# Patient Record
Sex: MALE | Race: WHITE | Employment: OTHER | RURAL
[De-identification: names, ages, dates, MRNs, and addresses within clinical notes are randomized per-mention and may not be internally consistent; named-entity substitution may affect disease eponyms.]

---

## 2017-01-09 ENCOUNTER — CLINICAL SUPPORT (OUTPATIENT)
Dept: INTERNAL MEDICINE CLINIC | Age: 62
End: 2017-01-09

## 2017-01-09 DIAGNOSIS — Z86.711 HISTORY OF PULMONARY EMBOLISM: Primary | ICD-10-CM

## 2017-01-09 NOTE — PROGRESS NOTES
Current dose of Coumadin is 3mg on Mon, Wed, and Fridays - 2mg on all other days - PT/ INR 23.7/ 2.0 - /75, heart rate at 78 - per verbal order of Parveen Devi MD advised patient to continue Coumadin with no change and follow up in 1 month to recheck PT level  Becky Devi LPN  7/3/0918  2:79 PM

## 2017-03-03 ENCOUNTER — CLINICAL SUPPORT (OUTPATIENT)
Dept: INTERNAL MEDICINE CLINIC | Age: 62
End: 2017-03-03

## 2017-03-03 DIAGNOSIS — Z86.711 HISTORY OF PULMONARY EMBOLISM: Primary | ICD-10-CM

## 2017-03-03 LAB
INR BLD: 2
PT POC: 24.2 SEC
VALID INTERNAL CONTROL?: YES

## 2017-03-03 RX ORDER — WARFARIN 2 MG/1
TABLET ORAL
Qty: 60 TAB | Refills: 1 | Status: SHIPPED | OUTPATIENT
Start: 2017-03-03 | End: 2017-06-21 | Stop reason: SDUPTHER

## 2017-03-03 NOTE — TELEPHONE ENCOUNTER
Requested Prescriptions     Pending Prescriptions Disp Refills    warfarin (COUMADIN) 2 mg tablet 60 Tab 1     Sig: 3 mg (1.5 tabs) on Mon Weds Fri and Sat.  2 mg (1 Tab) on the other days     Last office visit 12/20/16  Last filled 9/19/16  Changes made to medication on last visit  none

## 2017-03-03 NOTE — PROGRESS NOTES
Chief Complaint   Patient presents with    Coagulation disorder       No pain, no chest pain, no shortness of breath, no bleeding, no redness, and no swelling on finger at INR site. PT/INR  24.2/2.0     As per Dr. Margarette Macdonald, no new orders. Returned in 4 weeks.

## 2017-05-09 ENCOUNTER — CLINICAL SUPPORT (OUTPATIENT)
Dept: INTERNAL MEDICINE CLINIC | Age: 62
End: 2017-05-09

## 2017-05-09 DIAGNOSIS — Z86.711 HISTORY OF PULMONARY EMBOLISM: Primary | ICD-10-CM

## 2017-05-09 LAB
INR BLD: 2.5
PT POC: 30 SEC
VALID INTERNAL CONTROL?: YES

## 2017-05-09 NOTE — PROGRESS NOTES
Current Coumadin dose is 7.5mg on Mon, wed, Fri - 5mg all other days - PT/INR 30.0/ 2.5 - per verbal order of Jason Dorantes MD patient was instructed to continue with current dosage and follow up to recheck PT level in 1 month  Codie Evans LPN  4/0/1168  3:30 AM

## 2017-06-21 ENCOUNTER — CLINICAL SUPPORT (OUTPATIENT)
Dept: INTERNAL MEDICINE CLINIC | Age: 62
End: 2017-06-21

## 2017-06-21 DIAGNOSIS — Z86.711 HISTORY OF PULMONARY EMBOLISM: Primary | ICD-10-CM

## 2017-06-21 LAB
INR BLD: 2.1
PT POC: 25.7 SECONDS
VALID INTERNAL CONTROL?: YES

## 2017-06-21 RX ORDER — WARFARIN 2 MG/1
TABLET ORAL
Qty: 60 TAB | Refills: 1 | Status: SHIPPED | OUTPATIENT
Start: 2017-06-21 | End: 2017-09-11 | Stop reason: SDUPTHER

## 2017-06-21 NOTE — PROGRESS NOTES
Current dose of Coumadin is 3mg on Mon, Wed and Fri - 2mg all other days - PT/ INR 25.7/ 2.1 - per verbal order of Omkar Zuniga MD patient was instructed to continue with his current dose of Coumadin and recheck PT level in 1 month  Jessica Nieves LPN  8/48/8708  6:03 AM

## 2017-07-14 ENCOUNTER — OFFICE VISIT (OUTPATIENT)
Dept: INTERNAL MEDICINE CLINIC | Age: 62
End: 2017-07-14

## 2017-07-14 VITALS
HEIGHT: 76 IN | DIASTOLIC BLOOD PRESSURE: 81 MMHG | TEMPERATURE: 98 F | HEART RATE: 96 BPM | SYSTOLIC BLOOD PRESSURE: 122 MMHG | WEIGHT: 310 LBS | RESPIRATION RATE: 17 BRPM | BODY MASS INDEX: 37.75 KG/M2 | OXYGEN SATURATION: 97 %

## 2017-07-14 DIAGNOSIS — Z86.711 HISTORY OF PULMONARY EMBOLISM: ICD-10-CM

## 2017-07-14 DIAGNOSIS — J01.00 ACUTE NON-RECURRENT MAXILLARY SINUSITIS: Primary | ICD-10-CM

## 2017-07-14 PROBLEM — M25.621 DECREASED ROM OF RIGHT ELBOW: Status: ACTIVE | Noted: 2017-06-29

## 2017-07-14 LAB
INR BLD: 2
PT POC: 23.5 SECONDS
VALID INTERNAL CONTROL?: YES

## 2017-07-14 RX ORDER — TRAMADOL HYDROCHLORIDE 50 MG/1
TABLET ORAL
Refills: 0 | COMMUNITY
Start: 2017-05-19 | End: 2017-09-11 | Stop reason: ALTCHOICE

## 2017-07-14 RX ORDER — KETOCONAZOLE 20 MG/G
CREAM TOPICAL
COMMUNITY
Start: 2017-05-09 | End: 2017-09-11 | Stop reason: ALTCHOICE

## 2017-07-14 RX ORDER — HYDROCODONE BITARTRATE AND ACETAMINOPHEN 5; 325 MG/1; MG/1
TABLET ORAL
COMMUNITY
Start: 2017-05-02 | End: 2017-09-11 | Stop reason: ALTCHOICE

## 2017-07-14 RX ORDER — AMOXICILLIN AND CLAVULANATE POTASSIUM 500; 125 MG/1; MG/1
1 TABLET, FILM COATED ORAL 2 TIMES DAILY
Qty: 20 TAB | Refills: 0 | Status: SHIPPED | OUTPATIENT
Start: 2017-07-14 | End: 2017-07-24

## 2017-07-14 RX ORDER — TETANUS TOXOID, REDUCED DIPHTHERIA TOXOID AND ACELLULAR PERTUSSIS VACCINE, ADSORBED 5; 2.5; 8; 8; 2.5 [IU]/.5ML; [IU]/.5ML; UG/.5ML; UG/.5ML; UG/.5ML
SUSPENSION INTRAMUSCULAR
COMMUNITY
Start: 2017-05-02 | End: 2020-09-18

## 2017-07-14 NOTE — PROGRESS NOTES
Chief Complaint   Patient presents with    Sinus Infection     X 1 WEEK, NASAL DISCHARGE, PRODUCTIVE COUGH    Coagulation disorder     1. Have you been to the ER, urgent care clinic since your last visit? Hospitalized since your last visit? Yes When: [facility] Where: May 2017 Reason for visit: Broken Arm    2. Have you seen or consulted any other health care providers outside of the [address] [place] [address] since your last visit? Include any pap smears or colon screening. No     Health Maintenance Due   Topic Date Due    Hepatitis C Screening  1955    COLONOSCOPY  09/23/1972    DTaP/Tdap/Td series (1 - Tdap) 09/23/1975    ZOSTER VACCINE AGE 60>  09/23/2014     Colonoscopy performed February 2011.

## 2017-07-14 NOTE — MR AVS SNAPSHOT
Visit Information Date & Time Provider Department Dept. Phone Encounter #  
 7/14/2017  2:00 PM Neelam Davis NP [facility] [phone] [id number] Upcoming Health Maintenance Date Due Hepatitis C Screening 1955 COLONOSCOPY 9/23/1972 DTaP/Tdap/Td series (1 - Tdap) 9/23/1975 ZOSTER VACCINE AGE 60> 9/23/2014 INFLUENZA AGE 9 TO ADULT 8/1/2017 Allergies as of 7/14/2017  Review Complete On: 7/14/2017 By: Neelam Davis NP No Known Allergies Current Immunizations  Never Reviewed No immunizations on file. Not reviewed this visit You Were Diagnosed With   
  
 Codes Comments History of pulmonary embolism    -  Primary ICD-10-CM: Z86.711 ICD-9-CM: V12.55 Acute non-recurrent maxillary sinusitis     ICD-10-CM: J01.00 ICD-9-CM: 461.0 Vitals BP Pulse Temp Resp Height(growth percentile) Weight(growth percentile) 122/81 (BP 1 Location: Left arm, BP Patient Position: Sitting) 96 98 °F (36.7 °C) (Oral) 17 6' 4\" (1.93 m) 310 lb (140.6 kg) SpO2 BMI Smoking Status 97% 37.73 kg/m2 Former Smoker BMI and BSA Data Body Mass Index Body Surface Area  
 37.73 kg/m 2 2.75 m 2 Preferred Pharmacy Pharmacy Name Phone [facility] [phone] Your Updated Medication List  
  
   
This list is accurate as of: 7/14/17  3:03 PM.  Always use your most recent med list.  
  
  
  
  
 acetaminophen 500 mg tablet Commonly known as:  TYLENOL Take  by mouth every six (6) hours as needed for Pain.  
  
 amoxicillin-clavulanate 500-125 mg per tablet Commonly known as:  AUGMENTIN Take 1 Tab by mouth two (2) times a day for 10 days. BOOSTRIX TDAP 2.5-8-5 Lf-mcg-Lf/0.5mL Susp susp Generic drug:  Diphth, Pertus(Acell), Tetanus HYDROcodone-acetaminophen 5-325 mg per tablet Commonly known as:  Олег Miller  
  
 ketoconazole 2 % topical cream  
Commonly known as:  NIZORAL  
  
 meclizine 25 mg tablet Commonly known as:  ANTIVERT Take  by mouth three (3) times daily as needed. traMADol 50 mg tablet Commonly known as:  ULTRAM  
take 1 tablet by mouth three times a day  
  
 triamcinolone 55 mcg nasal inhaler Commonly known as:  NASACORT AQ  
2 Sprays daily. TRIAMTERENE-HYDROCHLOROTHIAZIDE 37.5 MG-25 MG TAB  
  
 warfarin 2 mg tablet Commonly known as:  COUMADIN  
3 mg (1.5 tabs) on Mon Weds Fri and Sat. 2 mg (1 Tab) on the other days Prescriptions Sent to Pharmacy Refills  
 amoxicillin-clavulanate (AUGMENTIN) 500-125 mg per tablet 0 Sig: Take 1 Tab by mouth two (2) times a day for 10 days. Class: Normal  
 Pharmacy: [facility] [address], [address] [place] [address] #: [phone] Route: Oral  
  
We Performed the Following AMB POC PT/INR [55238 CPT(R)] Introducing [facility]! Alexia Manriquez introduces [employer] patient portal. Now you can access parts of your medical record, email your doctor's office, and request medication refills online. 1. In your internet browser, go to [email or web address] 2. Click on the First Time User? Click Here link in the Sign In box. You will see the New Member Sign Up page. 3. Enter your [employer] Access Code exactly as it appears below. You will not need to use this code after youve completed the sign-up process. If you do not sign up before the expiration date, you must request a new code. · [employer] Access Code: [email or web address] Expires: 8/7/2017  9:08 AM 
 
4. Enter the last four digits of your Social Security Number (xxxx) and Date of Birth (mm/dd/yyyy) as indicated and click Submit. You will be taken to the next sign-up page. 5. Create a [employer]era ID. This will be your [employer] login ID and cannot be changed, so think of one that is secure and easy to remember. 6. Create a [employer] password. You can change your password at any time. 7. Enter your Password Reset Question and Answer. This can be used at a later time if you forget your password. 8. Enter your e-mail address. You will receive e-mail notification when new information is available in [address] 9. Click Sign Up. You can now view and download portions of your medical record. 10. Click the Download Summary menu link to download a portable copy of your medical information. If you have questions, please visit the Frequently Asked Questions section of the [employer] website. Remember, [employer] is NOT to be used for urgent needs. For medical emergencies, dial 911. Now available from your iPhone and Android! Please provide this summary of care documentation to your next provider. Your primary care clinician is listed as Terrance Armando. If you have any questions after today's visit, please call [phone].

## 2017-07-20 NOTE — PROGRESS NOTES
HISTORY OF PRESENT ILLNESS  Raffaele Troy is a 57 y.o. male. HPI  Chief Complaint   Patient presents with    Sinus Infection     X 1 WEEK, NASAL DISCHARGE, PRODUCTIVE COUGH    Coagulation disorder     PT/INR 2.0   C/O nasal sinus congestion with nasal discharge with blowing nose and coughing up thick sputum. Denies fever or chills. Review of Systems   Constitutional: Negative. Negative for chills and fever. HENT: Negative. Negative for congestion, ear pain and sore throat. Eyes: Negative. Negative for blurred vision. Respiratory: Negative. Negative for cough and shortness of breath. Cardiovascular: Negative. Negative for chest pain and leg swelling. Gastrointestinal: Negative. Negative for abdominal pain, constipation, diarrhea, heartburn, nausea and vomiting. Genitourinary: Negative. Musculoskeletal: Negative. Skin: Negative. Neurological: Negative. Negative for headaches. Physical Exam   Constitutional: He is oriented to person, place, and time. He appears well-developed and well-nourished. No distress. HENT:   Head: Normocephalic and atraumatic. Positive for pain with palpation of frontal and maxillary sinuses. Eyes: Conjunctivae are normal.   Cardiovascular: Normal rate, regular rhythm, normal heart sounds and intact distal pulses. Exam reveals no gallop and no friction rub. No murmur heard. Pulmonary/Chest: Effort normal and breath sounds normal. No respiratory distress. He has no wheezes. He has no rales. Musculoskeletal: Normal range of motion. Neurological: He is alert and oriented to person, place, and time. Skin: Skin is warm and dry. He is not diaphoretic. Nursing note and vitals reviewed. Plan of care and AVS reviewed with patient who verbalized understanding. ASSESSMENT and PLAN    ICD-10-CM ICD-9-CM    1. Acute non-recurrent maxillary sinusitis J01.00 461.0 amoxicillin-clavulanate (AUGMENTIN) 500-125 mg per tablet   2.  History of pulmonary embolism Z86.711 V12.55 AMB POC PT/INR   REviewed with patient use of augmentin and SE.  1 month check PT/INR  Keep regular scheduled appointment with PCP.

## 2017-08-31 ENCOUNTER — CLINICAL SUPPORT (OUTPATIENT)
Dept: INTERNAL MEDICINE CLINIC | Age: 62
End: 2017-08-31

## 2017-08-31 VITALS
DIASTOLIC BLOOD PRESSURE: 69 MMHG | RESPIRATION RATE: 16 BRPM | OXYGEN SATURATION: 93 % | SYSTOLIC BLOOD PRESSURE: 115 MMHG | HEART RATE: 83 BPM | HEIGHT: 76 IN | TEMPERATURE: 97.2 F

## 2017-08-31 DIAGNOSIS — Z86.711 HISTORY OF PULMONARY EMBOLISM: Primary | ICD-10-CM

## 2017-08-31 LAB
INR BLD: 2.1
PT POC: 24.9 SECONDS
VALID INTERNAL CONTROL?: YES

## 2017-08-31 NOTE — PROGRESS NOTES
Chief Complaint   Patient presents with    Coagulation disorder     Prabha Alvarez is a 57 y.o. male who presents today for Anticoagulation monitoring. Indication: PE  Current dose:  Coumadin 2mg      New Coumadin dose:.current treatment plan is effective, no change in therapy per Dr. Roxanne Rowe    Next check to be scheduled for  6 weeks per Dr. Roxanne Rowe.

## 2017-09-11 ENCOUNTER — OFFICE VISIT (OUTPATIENT)
Dept: INTERNAL MEDICINE CLINIC | Age: 62
End: 2017-09-11

## 2017-09-11 VITALS
BODY MASS INDEX: 36.53 KG/M2 | HEART RATE: 81 BPM | OXYGEN SATURATION: 98 % | SYSTOLIC BLOOD PRESSURE: 136 MMHG | DIASTOLIC BLOOD PRESSURE: 86 MMHG | HEIGHT: 76 IN | WEIGHT: 300 LBS | TEMPERATURE: 96.5 F | RESPIRATION RATE: 16 BRPM

## 2017-09-11 DIAGNOSIS — H81.09 MENIERE DISEASE, UNSPECIFIED LATERALITY: ICD-10-CM

## 2017-09-11 DIAGNOSIS — Z86.711 HISTORY OF PULMONARY EMBOLISM: ICD-10-CM

## 2017-09-11 DIAGNOSIS — Z12.11 SCREEN FOR COLON CANCER: ICD-10-CM

## 2017-09-11 DIAGNOSIS — Z13.220 SCREENING CHOLESTEROL LEVEL: ICD-10-CM

## 2017-09-11 DIAGNOSIS — Z13.1 SCREENING FOR DIABETES MELLITUS: ICD-10-CM

## 2017-09-11 DIAGNOSIS — Z23 ENCOUNTER FOR IMMUNIZATION: ICD-10-CM

## 2017-09-11 DIAGNOSIS — Z12.5 PROSTATE CANCER SCREENING: ICD-10-CM

## 2017-09-11 DIAGNOSIS — M19.90 ARTHRITIS: ICD-10-CM

## 2017-09-11 DIAGNOSIS — Z00.00 ROUTINE MEDICAL EXAM: Primary | ICD-10-CM

## 2017-09-11 PROBLEM — M25.621 DECREASED ROM OF RIGHT ELBOW: Status: RESOLVED | Noted: 2017-06-29 | Resolved: 2017-09-11

## 2017-09-11 LAB
INR BLD: 1.7
PT POC: 20.3 SECONDS
VALID INTERNAL CONTROL?: YES

## 2017-09-11 RX ORDER — CYCLOBENZAPRINE HCL 5 MG
5 TABLET ORAL
Qty: 60 TAB | Refills: 0 | Status: SHIPPED | OUTPATIENT
Start: 2017-09-11 | End: 2020-09-18 | Stop reason: ALTCHOICE

## 2017-09-11 RX ORDER — WARFARIN 2 MG/1
TABLET ORAL
Qty: 90 TAB | Refills: 1 | Status: SHIPPED | OUTPATIENT
Start: 2017-09-11 | End: 2018-01-11 | Stop reason: SDUPTHER

## 2017-09-11 NOTE — PROGRESS NOTES
Subjective:     Stanley Mason is a 57 y.o. male presenting for annual exam and complete physical.  Been a little depressed since alf but he getting better golfing and fishing, forced out of work when he could not keep up. Dec taking meds for depression, not that bad. Dizziness stable inc if anxious like when mom hospitalized, no head trauma. Elbow doing OK  No complaints of exertional chest pain, excessive shortness of breath or focal weakness. Minimal swelling in lower legs or dizziness with standing. Wants MR for occa back pain issues maribel if he is more exertional.    Patient Active Problem List    Diagnosis Date Noted    Decreased ROM of right elbow 06/29/2017    Meniere disease 06/21/2016    History of pulmonary embolism 06/21/2016     Current Outpatient Prescriptions   Medication Sig Dispense Refill    cyclobenzaprine (FLEXERIL) 5 mg tablet Take 1 Tab by mouth three (3) times daily as needed for Muscle Spasm(s). 60 Tab 0    BOOSTRIX TDAP 2.5-8-5 Lf-mcg-Lf/0.5mL susp susp       warfarin (COUMADIN) 2 mg tablet 3 mg (1.5 tabs) on Mon Weds Fri and Sat. 2 mg (1 Tab) on the other days 60 Tab 1    triamcinolone (NASACORT AQ) 55 mcg nasal inhaler 2 Sprays daily.  acetaminophen (TYLENOL) 500 mg tablet Take  by mouth every six (6) hours as needed for Pain.        No Known Allergies  Past Medical History:   Diagnosis Date    Depression     Headache     Sarcoid ([address]     Full recovery     Past Surgical History:   Procedure Laterality Date    HX ACL RECONSTRUCTION  1997    HX CHOLECYSTECTOMY  2006    HX COLONOSCOPY  1/1/2013    HX TONSIL AND ADENOIDECTOMY       Family History   Problem Relation Age of Onset    Diabetes Mother     Stroke Mother     Cancer Father      Social History   Substance Use Topics    Smoking status: Former Smoker     Packs/day: 0.25     Years: 15.00     Types: Cigars     Quit date: 12/12/2016    Smokeless tobacco: Never Used    Alcohol use No        Lab Results  Component Value Date/Time   Cholesterol, total 177 06/21/2016 11:54 AM   HDL Cholesterol 42 06/21/2016 11:54 AM   LDL, calculated 111 06/21/2016 11:54 AM   Triglyceride 118 06/21/2016 11:54 AM     Lab Results  Component Value Date/Time   ALT (SGPT) 23 06/21/2016 11:54 AM   AST (SGOT) 38 06/21/2016 11:54 AM   Alk. phosphatase 134 06/21/2016 11:54 AM   Bilirubin, total 0.8 06/21/2016 11:54 AM   Albumin 4.2 06/21/2016 11:54 AM   Protein, total 7.2 06/21/2016 11:54 AM   INR POC 1.7 09/11/2017 08:45 AM   PLATELET 868 55/58/1890 11:54 AM       Lab Results   Component Value Date/Time    INR POC 1.7 09/11/2017 08:45 AM    INR POC 2.1 08/31/2017 12:04 PM    INR POC 2.0 07/14/2017 02:20 PM      Lab Results  Component Value Date/Time   GFR est non-AA 74 06/21/2016 11:54 AM   GFR est AA 85 06/21/2016 11:54 AM   Creatinine 1.08 06/21/2016 11:54 AM   BUN 14 06/21/2016 11:54 AM   Sodium 139 06/21/2016 11:54 AM   Potassium 5.1 06/21/2016 11:54 AM   Chloride 102 06/21/2016 11:54 AM   CO2 21 06/21/2016 11:54 AM     Lab Results   Component Value Date/Time    Glucose 94 06/21/2016 11:54 AM                     Review of Systems  Pertinent items are noted in HPI. Objective:     Physical exam:     Visit Vitals    /86 (BP 1 Location: Left arm, BP Patient Position: Sitting)    Pulse 81    Temp 96.5 °F (35.8 °C) (Oral)    Resp 16    Ht 6' 4\" (1.93 m)    Wt 300 lb (136.1 kg)    SpO2 98%    BMI 36.52 kg/m2       WDWN NAD  TM clear, throat wnl  Neck no adenopathy  Heart RRR no C/M/R  Lungs CTA  Abdo soft non tender  Ext No redness swelling or edema       Assessment/Plan:       ICD-10-CM ICD-9-CM    1. Routine medical exam Z00.00 V70.0 [place] HANDLG&/OR CONVEY OF SPEC FOR TR OFFICE TO LAB      AL COLLECTION VENOUS BLOOD,VENIPUNCTURE   2. History of pulmonary embolism Z86.711 V12.55 AMB POC PT/INR      AL HANDLG&/OR CONVEY OF SPEC FOR TR OFFICE TO LAB      [place] COLLECTION VENOUS BLOOD,VENIPUNCTURE   3.  Arthritis M19.90 716.90 [place] HANDLG&/OR CONVEY OF SPEC FOR TR OFFICE TO LAB      [place] COLLECTION VENOUS BLOOD,VENIPUNCTURE   4. Screen for colon cancer Z12.11 V76.51 [place] HANDLG&/OR CONVEY OF SPEC FOR TR OFFICE TO LAB      [place] COLLECTION VENOUS BLOOD,VENIPUNCTURE   5. Prostate cancer screening Z12.5 V76.44 [place] HANDLG&/OR CONVEY OF SPEC FOR TR OFFICE TO LAB      [place] COLLECTION VENOUS BLOOD,VENIPUNCTURE   6. Encounter for immunization Z23 V03.89 [place] HANDLG&/OR CONVEY OF SPEC FOR TR OFFICE TO LAB      [place] COLLECTION VENOUS BLOOD,VENIPUNCTURE   7. Screening cholesterol level Z13.220 V77.91 LIPID PANEL      [place] HANDLG&/OR CONVEY OF SPEC FOR TR OFFICE TO LAB      [place] COLLECTION VENOUS BLOOD,VENIPUNCTURE   8. Screening for diabetes mellitus O17.2 I03.3 METABOLIC PANEL, COMPREHENSIVE      [place] HANDLG&/OR CONVEY OF SPEC FOR TR OFFICE TO LAB      [place] COLLECTION VENOUS BLOOD,VENIPUNCTURE   9. Meniere disease, unspecified laterality H81.09 386.00 [place] HANDLG&/OR CONVEY OF SPEC FOR TR OFFICE TO LAB      [place] COLLECTION VENOUS BLOOD,VENIPUNCTURE       lose weight, routine labs ordered, call if any problems. Orders Placed This Encounter    METABOLIC PANEL, COMPREHENSIVE    LIPID PANEL    AMB POC PT/INR    cyclobenzaprine (FLEXERIL) 5 mg tablet    warfarin (COUMADIN) 2 mg tablet    varicella zoster vacine live (ZOSTAVAX) 19,400 unit/0.65 mL susr injection   . We discussed a screening exam STI tests psa and the  the pros and cons of having the test done. The patient made a decision to do the test: yes and dec both. Follow-up Disposition:  Return in about 6 months (around 3/11/2018) for nurse visit.

## 2017-09-11 NOTE — PATIENT INSTRUCTIONS

## 2017-09-11 NOTE — PROGRESS NOTES
Chief Complaint   Patient presents with    Physical     I have reviewed the patient's medical history in detail and updated the computerized patient record. Health Maintenance reviewed. 1. Have you been to the ER, urgent care clinic since your last visit? Hospitalized since your last visit? yes    2. Have you seen or consulted any other health care providers outside of the [address] [place] [address] since your last visit? Include any pap smears or colon screening. No    Do you have an [address] in place in the event that you have a healthcare crisis that could impact your decision making as it pertains to your health?

## 2017-09-11 NOTE — MR AVS SNAPSHOT
Visit Information Date & Time Provider Department Dept. Phone Encounter #  
 9/11/2017  8:15 AM Marta Meyer  [place]  [id number] Follow-up Instructions Return in about 4 weeks (around 10/9/2017) for nurse visit. Upcoming Health Maintenance Date Due DTaP/Tdap/Td series (1 - Tdap) 9/23/1975 ZOSTER VACCINE AGE 60> 7/23/2014 COLONOSCOPY 1/1/2023 Allergies as of 9/11/2017  Review Complete On: 9/11/2017 By: Marta Meyer MD  
 No Known Allergies Current Immunizations  Reviewed on 9/11/2017 No immunizations on file. Reviewed by Marta Meyer MD on 9/11/2017 at  9:02 AM  
You Were Diagnosed With   
  
 Codes Comments Routine medical exam    -  Primary ICD-10-CM: Z00.00 ICD-9-CM: V70.0 History of pulmonary embolism     ICD-10-CM: Z86.711 ICD-9-CM: V12.55 Arthritis     ICD-10-CM: M19.90 ICD-9-CM: 716.90 Screen for colon cancer     ICD-10-CM: Z12.11 ICD-9-CM: V76.51 Prostate cancer screening     ICD-10-CM: Z12.5 ICD-9-CM: V76.44 Encounter for immunization     ICD-10-CM: C95 ICD-9-CM: V03.89 Screening cholesterol level     ICD-10-CM: M90.081 ICD-9-CM: V77.91 Screening for diabetes mellitus     ICD-10-CM: Z13.1 ICD-9-CM: V77.1 Vitals BP Pulse Temp Resp Height(growth percentile) Weight(growth percentile) 138/90 (BP 1 Location: Left arm, BP Patient Position: Sitting) 81 96.5 °F (35.8 °C) (Oral) 16 6' 4\" (1.93 m) 300 lb (136.1 kg) SpO2 BMI Smoking Status 98% 36.52 kg/m2 Former Smoker Vitals History BMI and BSA Data Body Mass Index Body Surface Area  
 36.52 kg/m 2 2.7 m 2 Preferred Pharmacy Pharmacy Name Phone [facility] [address] [facility] [phone] Your Updated Medication List  
  
   
This list is accurate as of: 9/11/17  9:03 AM.  Always use your most recent med list.  
  
  
  
  
 acetaminophen 500 mg tablet Commonly known as:  TYLENOL Take  by mouth every six (6) hours as needed for Pain. BOOSTRIX TDAP 2.5-8-5 Lf-mcg-Lf/0.5mL Susp susp Generic drug:  Diphth, Pertus(Acell), Tetanus  
  
 cyclobenzaprine 5 mg tablet Commonly known as:  FLEXERIL Take 1 Tab by mouth three (3) times daily as needed for Muscle Spasm(s). triamcinolone 55 mcg nasal inhaler Commonly known as:  NASACORT AQ  
2 Sprays daily. varicella zoster vacine live 19,400 unit/0.65 mL Susr injection Commonly known as:  ZOSTAVAX  
1 Vial by SubCUTAneous route once for 1 dose. warfarin 2 mg tablet Commonly known as:  COUMADIN  
3 mg (1.5 tabs) on  and Sat. 2 mg (1 Tab) on the other days Prescriptions Printed Refills  
 varicella zoster vacine live (ZOSTAVAX) 19,400 unit/0.65 mL susr injection 0 Si Vial by SubCUTAneous route once for 1 dose. Class: Print Route: SubCUTAneous Prescriptions Sent to Pharmacy Refills  
 cyclobenzaprine (FLEXERIL) 5 mg tablet 0 Sig: Take 1 Tab by mouth three (3) times daily as needed for Muscle Spasm(s). Class: Normal  
 Pharmacy: [place] [address] [address] [facility] Ph #: [phone] Route: Oral  
 warfarin (COUMADIN) 2 mg tablet 1 Sig: 3 mg (1.5 tabs) on  and Sat. 2 mg (1 Tab) on the other days Class: Normal  
 Pharmacy: [place] [id number], [facility] Ph #: [phone] We Performed the Following AMB POC PT/INR [67015 CPT(R)] LIPID PANEL [87744 CPT(R)] METABOLIC PANEL, COMPREHENSIVE [48215 CPT(R)] Follow-up Instructions Return in about 4 weeks (around 10/9/2017) for nurse visit. Patient Instructions Well Visit, Men 48 to 72: Care Instructions Your Care Instructions Physical exams can help you stay healthy.  Your doctor has checked your overall health and may have suggested ways to take good care of yourself. He or she also may have recommended tests. At home, you can help prevent illness with healthy eating, regular exercise, and other steps. Follow-up care is a key part of your treatment and safety. Be sure to make and go to all appointments, and call your doctor if you are having problems. It's also a good idea to know your test results and keep a list of the medicines you take. How can you care for yourself at home? · Reach and stay at a healthy weight. This will lower your risk for many problems, such as obesity, diabetes, heart disease, and high blood pressure. · Get at least 30 minutes of exercise on most days of the week. Walking is a good choice. You also may want to do other activities, such as running, swimming, cycling, or playing tennis or team sports. · Do not smoke. Smoking can make health problems worse. If you need help quitting, talk to your doctor about stop-smoking programs and medicines. These can increase your chances of quitting for good. · Protect your skin from too much sun. When you're outdoors from 10 a.m. to 4 p.m., stay in the shade or cover up with clothing and a hat with a wide brim. Wear sunglasses that block UV rays. Even when it's cloudy, put broad-spectrum sunscreen (SPF 30 or higher) on any exposed skin. · See a dentist one or two times a year for checkups and to have your teeth cleaned. · Wear a seat belt in the car. · Limit alcohol to 2 drinks a day. Too much alcohol can cause health problems. Follow your doctor's advice about when to have certain tests. These tests can spot problems early. · Cholesterol. Your doctor will tell you how often to have this done based on your overall health and other things that can increase your risk for heart attack and stroke. · Blood pressure.  Have your blood pressure checked during a routine doctor visit. Your doctor will tell you how often to check your blood pressure based on your age, your blood pressure results, and other factors. · Prostate exam. Talk to your doctor about whether you should have a blood test (called a PSA test) for prostate cancer. Experts disagree on whether men should have this test. Some experts recommend that you discuss the benefits and risks of the test with your doctor. · Diabetes. Ask your doctor whether you should have tests for diabetes. · Vision. Some experts recommend that you have yearly exams for glaucoma and other age-related eye problems starting at age 48. · Hearing. Tell your doctor if you notice any change in your hearing. You can have tests to find out how well you hear. · Colon cancer. You should begin tests for colon cancer at age 48. You may have one of several tests. Your doctor will tell you how often to have tests based on your age and risk. Risks include whether you already had a precancerous polyp removed from your colon or whether your parent, brother, sister, or child has had colon cancer. · Heart attack and stroke risk. At least every 4 to 6 years, you should have your risk for heart attack and stroke assessed. Your doctor uses factors such as your age, blood pressure, cholesterol, and whether you smoke or have diabetes to show what your risk for a heart attack or stroke is over the next 10 years. · Abdominal aortic aneurysm. Ask your doctor whether you should have a test to check for an aneurysm. You may need a test if you ever smoked or if your parent, brother, sister, or child has had an aneurysm. When should you call for help? Watch closely for changes in your health, and be sure to contact your doctor if you have any problems or symptoms that concern you. Where can you learn more? Go to http://lon-mely.info/. Enter H853 in the search box to learn more about \"Well Visit, Men 48 to 72: Care Instructions. \" 
 Current as of: July 19, 2016 Content Version: 11.3 © 4686-0833 [employer]. Care instructions adapted under license by [employer] (which disclaims liability or warranty for this information). If you have questions about a medical condition or this instruction, always ask your healthcare professional. [address] any warranty or liability for your use of this information. Introducing [facility]! [facility] introduces Tribold patient portal. Now you can access parts of your medical record, email your doctor's office, and request medication refills online. 1. In your internet browser, go to [email or web address] 2. Click on the First Time User? Click Here link in the Sign In box. You will see the New Member Sign Up page. 3. Enter your Tribold Access Code exactly as it appears below. You will not need to use this code after youve completed the sign-up process. If you do not sign up before the expiration date, you must request a new code. · Tribold Access Code: [email or web address] Expires: 11/29/2017 11:39 AM 
 
4. Enter the last four digits of your Social Security Number (xxxx) and Date of Birth (mm/dd/yyyy) as indicated and click Submit. You will be taken to the next sign-up page. 5. Create a Tribold ID. This will be your Tribold login ID and cannot be changed, so think of one that is secure and easy to remember. 6. Create a Tribold password. You can change your password at any time. 7. Enter your Password Reset Question and Answer. This can be used at a later time if you forget your password. 8. Enter your e-mail address. You will receive e-mail notification when new information is available in [address] 9. Click Sign Up. You can now view and download portions of your medical record. 10. Click the Download Summary menu link to download a portable copy of your medical information. If you have questions, please visit the Frequently Asked Questions section of the [employer] website. Remember, Nano ePrint is NOT to be used for urgent needs. For medical emergencies, dial 911. Now available from your iPhone and Android! Please provide this summary of care documentation to your next provider. Your primary care clinician is listed as General Rabago. If you have any questions after today's visit, please call [phone].

## 2017-09-12 LAB
ALBUMIN SERPL-MCNC: 3.9 G/DL (ref 3.6–4.8)
ALBUMIN/GLOB SERPL: 1.3 {RATIO} (ref 1.2–2.2)
ALP SERPL-CCNC: 198 IU/L (ref 39–117)
ALT SERPL-CCNC: 36 IU/L (ref 0–44)
AST SERPL-CCNC: 53 IU/L (ref 0–40)
BILIRUB SERPL-MCNC: 1.1 MG/DL (ref 0–1.2)
BUN SERPL-MCNC: 11 MG/DL (ref 8–27)
BUN/CREAT SERPL: 12 (ref 10–24)
CALCIUM SERPL-MCNC: 8.8 MG/DL (ref 8.6–10.2)
CHLORIDE SERPL-SCNC: 104 MMOL/L (ref 96–106)
CHOLEST SERPL-MCNC: 189 MG/DL (ref 100–199)
CO2 SERPL-SCNC: 19 MMOL/L (ref 18–29)
CREAT SERPL-MCNC: 0.89 MG/DL (ref 0.76–1.27)
GLOBULIN SER CALC-MCNC: 3.1 G/DL (ref 1.5–4.5)
GLUCOSE SERPL-MCNC: 94 MG/DL (ref 65–99)
HDLC SERPL-MCNC: 45 MG/DL
INTERPRETATION, 910389: NORMAL
LDLC SERPL CALC-MCNC: 118 MG/DL (ref 0–99)
POTASSIUM SERPL-SCNC: 4.5 MMOL/L (ref 3.5–5.2)
PROT SERPL-MCNC: 7 G/DL (ref 6–8.5)
SODIUM SERPL-SCNC: 140 MMOL/L (ref 134–144)
TRIGL SERPL-MCNC: 129 MG/DL (ref 0–149)
VLDLC SERPL CALC-MCNC: 26 MG/DL (ref 5–40)

## 2017-09-13 ENCOUNTER — TELEPHONE (OUTPATIENT)
Dept: INTERNAL MEDICINE CLINIC | Age: 62
End: 2017-09-13

## 2017-09-13 NOTE — TELEPHONE ENCOUNTER
Send normal/stable results letter.     Please schedule an appointment to discuss the slightly elevated liver enzyme like we discussed

## 2017-09-15 NOTE — TELEPHONE ENCOUNTER
As per Dr. Catie Donald, labs normal except slightly elevated liver.   Pt made an appt with Dr. Catie Donald

## 2017-10-05 ENCOUNTER — OFFICE VISIT (OUTPATIENT)
Dept: INTERNAL MEDICINE CLINIC | Age: 62
End: 2017-10-05

## 2017-10-05 VITALS
DIASTOLIC BLOOD PRESSURE: 65 MMHG | HEIGHT: 76 IN | WEIGHT: 307 LBS | HEART RATE: 78 BPM | SYSTOLIC BLOOD PRESSURE: 118 MMHG | RESPIRATION RATE: 20 BRPM | BODY MASS INDEX: 37.38 KG/M2 | TEMPERATURE: 96 F | OXYGEN SATURATION: 97 %

## 2017-10-05 DIAGNOSIS — R74.8 ELEVATED LIVER ENZYMES: Primary | ICD-10-CM

## 2017-10-05 DIAGNOSIS — Z86.711 HISTORY OF PULMONARY EMBOLISM: ICD-10-CM

## 2017-10-05 DIAGNOSIS — E66.9 OBESITY (BMI 30-39.9): ICD-10-CM

## 2017-10-05 LAB
INR BLD: 2.4
PT POC: 28.6 SECONDS
VALID INTERNAL CONTROL?: YES

## 2017-10-05 NOTE — PATIENT INSTRUCTIONS
For your allergies use an over the counter preparation like Allegra or Zyrtec which is less sedating then Benedryl. In addition Flonase or Nasacort which are steroid nasal sprays are also available OTC. Call me if the symptoms continue or worsen. We discussed stratagies to reduce weight. Specifically discussed the Intemmitant Fasting diet, the Celebrations.[address] or Wattage. Discussed weight loss programs as well as exercise, although emphasized caloric restriction. Consider keeping a food diary and seeing what you eat in a day and looking for where you can cut calories. Try taking a picture of everything you eat for a day. Phone apps can help witrh weight loss. Consider 'Lose It'  You must reduce liquid calories like soda and juices. Weight Watchers or other weight loss programs can be very helpful. There is no \"magic pill', but there some newly FDA approved medications for obesity. Learning About Low-Carbohydrate Diets for Weight Loss  What is a low-carbohydrate diet? Low-carb diets avoid foods that are high in carbohydrate. These high-carb foods include pasta, bread, rice, cereal, fruits, and starchy vegetables. Instead, these diets usually have you eat foods that are high in fat and protein. Many people lose weight quickly on a low-carb diet. But the early weight loss is water. People on this diet often gain the weight back after they start eating carbs again. Not all diet plans are safe or work well. A lot of the evidence shows that low-carb diets aren't healthy. That's because these diets often don't include healthy foods like fruits and vegetables. Losing weight safely means balancing protein, fat, and carbs with every meal and snack. And low-carb diets don't always provide the vitamins, minerals, and fiber you need. If you have a serious medical condition, talk to your doctor before you try any diet.  These conditions include kidney disease, heart disease, type 2 diabetes, high cholesterol, and high blood pressure. If you are pregnant, it may not be safe for your baby if you are on a low-carb diet. How can you lose weight safely? You might have heard that a diet plan helped another person lose weight. But that doesn't mean that it will work for you. It is very hard to stay on a diet that includes lots of big changes in your eating habits. If you want to get to a healthy weight and stay there, making healthy lifestyle changes will often work better than dieting. These steps can help. · Make a plan for change. Work with your doctor to create a plan that is right for you. · See a dietitian. He or she can show you how to make healthy changes in your eating habits. · Manage stress. If you have a lot of stress in your life, it can be hard to focus on making healthy changes to your daily habits. · Track your food and activity. You are likely to do better at losing weight if you keep track of what you eat and what you do. Follow-up care is a key part of your treatment and safety. Be sure to make and go to all appointments, and call your doctor if you are having problems. It's also a good idea to know your test results and keep a list of the medicines you take. Where can you learn more? Go to http://lon-mely.info/. Enter A121 in the search box to learn more about \"Learning About Low-Carbohydrate Diets for Weight Loss. \"  Current as of: December 8, 2016  Content Version: 11.3  © 9093-9084 [employer]. Care instructions adapted under license by [employer] (which disclaims liability or warranty for this information). If you have questions about a medical condition or this instruction, always ask your healthcare professional. [address] any warranty or liability for your use of this information.

## 2017-10-05 NOTE — PROGRESS NOTES
HISTORY OF PRESENT ILLNESS  Rowena Goodell is a 60 y.o. male. Elevated Liver Enzymes   The history is provided by the patient. This is a new problem. Episode onset: routine labs 9-11. The problem has not changed since onset. Pertinent negatives include no chest pain, no abdominal pain and no shortness of breath. Anticoagulation   This is a chronic problem. The problem has not changed since onset. Pertinent negatives include no chest pain, no abdominal pain and no shortness of breath. Been more physical lately. No Hep B or C risk factors does not drink alcohol. Takes his BT, no bleeding, breathing OK. Red dosing APAP. Current Outpatient Prescriptions   Medication Sig Dispense Refill    warfarin (COUMADIN) 2 mg tablet 3 mg (1.5 tabs) on Mon Weds Fri and Sat. 2 mg (1 Tab) on the other days 90 Tab 1    triamcinolone (NASACORT AQ) 55 mcg nasal inhaler 2 Sprays daily.  cyclobenzaprine (FLEXERIL) 5 mg tablet Take 1 Tab by mouth three (3) times daily as needed for Muscle Spasm(s). 60 Tab 0    BOOSTRIX TDAP 2.5-8-5 Lf-mcg-Lf/0.5mL susp susp       acetaminophen (TYLENOL) 500 mg tablet Take  by mouth every six (6) hours as needed for Pain. Social History     Social History    Marital status:      Spouse name: N/A    Number of children: 3    Years of education: N/A     Occupational History    retired      Social History Main Topics    Smoking status: Former Smoker     Packs/day: 0.25     Years: 15.00     Types: Cigars     Quit date: 12/12/2016    Smokeless tobacco: Never Used    Alcohol use No    Drug use: No    Sexual activity: Yes     Partners: Female     Other Topics Concern    Not on file     Social History Narrative    Lives with wife        Review of Systems   HENT: Positive for congestion. Respiratory: Negative for shortness of breath. Cardiovascular: Negative for chest pain. Gastrointestinal: Negative for abdominal pain and blood in stool.        Physical Exam  Visit Vitals    /65 (BP 1 Location: Left arm, BP Patient Position: At rest)    Pulse 78    Temp 96 °F (35.6 °C) (Oral)    Resp 20    Ht 6' 4\" (1.93 m)    Wt 307 lb (139.3 kg)    SpO2 97%    BMI 37.37 kg/m2     WD WN male NAD who is OW  Heart RRR without murmers clicks or rubs  Lungs CTA  Abdo soft nontender  Ext no edema  Labs from today were reviewed  yes, labs done previously were reviewed  yes, Labs done in ER were reviewed  no, Additional labs are ordered  yes,      ASSESSMENT and PLAN  Encounter Diagnoses   Name Primary?  History of pulmonary embolism Yes    Elevated liver enzymes     Obesity (BMI 30-39.9)      Orders Placed This Encounter    HEP B SURFACE AG    HEPATITIS C AB    HEPATIC FUNCTION PANEL    AMB POC PT/INR     May consider US if Cont elevated LFT's  May just be spurious. Would not hurt to lose weight for fatty liver. See patient instructions, went over them personally with the patient. Emphasized compliance. Questions answered. Patient states that they understand the plan of action and will call if there are any issues or misunderstandings. Follow-up Disposition:  Return in about 4 months (around 2/5/2018) for routine follow up.

## 2017-10-05 NOTE — MR AVS SNAPSHOT
Visit Information Date & Time Provider Department Dept. Phone Encounter #  
 10/5/2017  7:30 AM MD Rosanne Mcleod Amendbekah Servin [id number] Follow-up Instructions Return in about 4 months (around 2/5/2018) for routine follow up. Upcoming Health Maintenance Date Due DTaP/Tdap/Td series (1 - Tdap) 9/23/1975 ZOSTER VACCINE AGE 60> 7/23/2014 COLONOSCOPY 1/1/2023 Allergies as of 10/5/2017  Review Complete On: 10/5/2017 By: Dandy Titus MD  
 No Known Allergies Current Immunizations  Reviewed on 10/5/2017 Name Date Tdap 5/2/2017 Reviewed by Vianey Andre LPN on 62/7/3533 at  7:38 AM  
You Were Diagnosed With   
  
 Codes Comments History of pulmonary embolism    -  Primary ICD-10-CM: Z86.711 ICD-9-CM: V12.55 Elevated liver enzymes     ICD-10-CM: R74.8 ICD-9-CM: 790.5 Obesity (BMI 30-39. 9)     ICD-10-CM: E66.9 ICD-9-CM: 278.00 Vitals BP Pulse Temp Resp Height(growth percentile) Weight(growth percentile)  
 118/65 (BP 1 Location: Left arm, BP Patient Position: At rest) 78 96 °F (35.6 °C) (Oral) 20 6' 4\" (1.93 m) 307 lb (139.3 kg) SpO2 BMI Smoking Status 97% 37.37 kg/m2 Former Smoker Vitals History BMI and BSA Data Body Mass Index Body Surface Area  
 37.37 kg/m 2 2.73 m 2 Preferred Pharmacy Pharmacy Name Phone [facility] [phone] Your Updated Medication List  
  
   
This list is accurate as of: 10/5/17  8:13 AM.  Always use your most recent med list.  
  
  
  
  
 acetaminophen 500 mg tablet Commonly known as:  TYLENOL Take  by mouth every six (6) hours as needed for Pain. BOOSTRIX TDAP 2.5-8-5 Lf-mcg-Lf/0.5mL Susp susp Generic drug:  Diphth, Pertus(Acell), Tetanus  
  
 cyclobenzaprine 5 mg tablet Commonly known as:  FLEXERIL  
 Take 1 Tab by mouth three (3) times daily as needed for Muscle Spasm(s). triamcinolone 55 mcg nasal inhaler Commonly known as:  NASACORT AQ  
2 Sprays daily. warfarin 2 mg tablet Commonly known as:  COUMADIN  
3 mg (1.5 tabs) on Mon Weds Fri and Sat. 2 mg (1 Tab) on the other days We Performed the Following AMB POC PT/INR [40821 CPT(R)] HEP B SURFACE AG [id number] CPT(R)] HEPATIC FUNCTION PANEL [17490 CPT(R)] HEPATITIS C AB [81614 CPT(R)] Follow-up Instructions Return in about 4 months (around 2/5/2018) for routine follow up. Patient Instructions For your allergies use an over the counter preparation like Allegra or Zyrtec which is less sedating then Benedryl. In addition Flonase or Nasacort which are steroid nasal sprays are also available OTC. Call me if the symptoms continue or worsen. We discussed stratagies to reduce weight. Specifically discussed the Intemmitant Fasting diet, the [address] or Viralize. Discussed weight loss programs as well as exercise, although emphasized caloric restriction. Consider keeping a food diary and seeing what you eat in a day and looking for where you can cut calories. Try taking a picture of everything you eat for a day. Phone apps can help witrh weight loss. Consider 'Lose It' You must reduce liquid calories like soda and juices. Weight Watchers or other weight loss programs can be very helpful. There is no \"magic pill', but there some newly FDA approved medications for obesity. Learning About Low-Carbohydrate Diets for Weight Loss What is a low-carbohydrate diet? Low-carb diets avoid foods that are high in carbohydrate. These high-carb foods include pasta, bread, rice, cereal, fruits, and starchy vegetables. Instead, these diets usually have you eat foods that are high in fat and protein. Many people lose weight quickly on a low-carb diet.  But the early weight loss is water. People on this diet often gain the weight back after they start eating carbs again. Not all diet plans are safe or work well. A lot of the evidence shows that low-carb diets aren't healthy. That's because these diets often don't include healthy foods like fruits and vegetables. Losing weight safely means balancing protein, fat, and carbs with every meal and snack. And low-carb diets don't always provide the vitamins, minerals, and fiber you need. If you have a serious medical condition, talk to your doctor before you try any diet. These conditions include kidney disease, heart disease, type 2 diabetes, high cholesterol, and high blood pressure. If you are pregnant, it may not be safe for your baby if you are on a low-carb diet. How can you lose weight safely? You might have heard that a diet plan helped another person lose weight. But that doesn't mean that it will work for you. It is very hard to stay on a diet that includes lots of big changes in your eating habits. If you want to get to a healthy weight and stay there, making healthy lifestyle changes will often work better than dieting. These steps can help. · Make a plan for change. Work with your doctor to create a plan that is right for you. · See a dietitian. He or she can show you how to make healthy changes in your eating habits. · Manage stress. If you have a lot of stress in your life, it can be hard to focus on making healthy changes to your daily habits. · Track your food and activity. You are likely to do better at losing weight if you keep track of what you eat and what you do. Follow-up care is a key part of your treatment and safety. Be sure to make and go to all appointments, and call your doctor if you are having problems. It's also a good idea to know your test results and keep a list of the medicines you take. Where can you learn more? Go to http://lon-mely.info/. Enter A121 in the search box to learn more about \"Learning About Low-Carbohydrate Diets for Weight Loss. \" Current as of: December 8, 2016 Content Version: 11.3 © 3440-1350 [employer], [employer]. Care instructions adapted under license by [employer] (which disclaims liability or warranty for this information). If you have questions about a medical condition or this instruction, always ask your healthcare professional. [place] [address] any warranty or liability for your use of this information. Introducing [address]! Katja Loud introduces [employer] patient portal. Now you can access parts of your medical record, email your doctor's office, and request medication refills online. 1. In your internet browser, go to [email or web address] Website 2. Click on the First Time User? Click Here link in the Sign In box. You will see the New Member Sign Up page. 3. Enter your Pad[employer] Access Code exactly as it appears below. You will not need to use this code after youve completed the sign-up process. If you do not sign up before the expiration date, you must request a new code. · [employer] Access Code: [email or web address] Expires: 11/29/2017 11:39 AM 
 
4. Enter the last four digits of your Social Security Number (xxxx) and Date of Birth (mm/dd/yyyy) as indicated and click Submit. You will be taken to the next sign-up page. 5. Create a Padcom ID. This will be your Padcom login ID and cannot be changed, so think of one that is secure and easy to remember. 6. Create a Padcom password. You can change your password at any time. 7. Enter your Password Reset Question and Answer. This can be used at a later time if you forget your password. 8. Enter your e-mail address. You will receive e-mail notification when new information is available in [address] 9. Click Sign Up. You can now view and download portions of your medical record. 10. Click the Download Summary menu link to download a portable copy of your medical information. If you have questions, please visit the Frequently Asked Questions section of the [employer] website. Remember, [employer] is NOT to be used for urgent needs. For medical emergencies, dial 911. Now available from your iPhone and Android! Please provide this summary of care documentation to your next provider. Your primary care clinician is listed as Nhung Mark. If you have any questions after today's visit, please call [phone].

## 2017-10-05 NOTE — LETTER
10/9/2017 1:48 PM 
 
Mr. Zhane Dang [address] [address] Dear Zhane Dang: 
 
Please find your most recent results below. Resulted Orders AMB POC PT/INR Result Value Ref Range VALID INTERNAL CONTROL POC Yes Prothrombin time (POC) 28.6 seconds INR POC 2.4 HEP B SURFACE AG Result Value Ref Range Hep B surface Ag screen Negative Negative Narrative Performed at:  [address]  [id number] : Edward Hawley MD, Phone:  [phone] HEPATITIS C AB Result Value Ref Range Hep C Virus Ab <0.1 0.0 - 0.9 s/co ratio Comment:  
                                     Negative:     < 0.8 Indeterminate: 0.8 - 0.9 Positive:     > 0.9 The CDC recommends that a positive HCV antibody result 
 be followed up with a HCV Nucleic Acid Amplification 
 test (740718). Narrative Performed at:  [address]  [id number] : Edward Hawley MD, Phone:  [phone] HEPATIC FUNCTION PANEL Result Value Ref Range Protein, total 7.0 6.0 - 8.5 g/dL Albumin 3.9 3.6 - 4.8 g/dL Bilirubin, total 0.8 0.0 - 1.2 mg/dL Bilirubin, direct 0.25 0.00 - 0.40 mg/dL Alk. phosphatase 193 (H) 39 - 117 IU/L  
 AST (SGOT) 46 (H) 0 - 40 IU/L  
 ALT (SGPT) 30 0 - 44 IU/L Narrative Performed at:  [address]  [id number] : Edward Hawley MD, Phone:  [phone] RECOMMENDATIONS: 
The results of your most recent labs show that you have no infection with Hepatitis B or C, But your liver enzymes remain slightly elevated. Try to lose some weight and follow up with me as scheduled. Please call me if you have any questions: [phone] Sincerely, Haydee Mora MD

## 2017-10-06 LAB
ALBUMIN SERPL-MCNC: 3.9 G/DL (ref 3.6–4.8)
ALP SERPL-CCNC: 193 IU/L (ref 39–117)
ALT SERPL-CCNC: 30 IU/L (ref 0–44)
AST SERPL-CCNC: 46 IU/L (ref 0–40)
BILIRUB DIRECT SERPL-MCNC: 0.25 MG/DL (ref 0–0.4)
BILIRUB SERPL-MCNC: 0.8 MG/DL (ref 0–1.2)
HBV SURFACE AG SERPL QL IA: NEGATIVE
HCV AB S/CO SERPL IA: <0.1 S/CO RATIO (ref 0–0.9)
PROT SERPL-MCNC: 7 G/DL (ref 6–8.5)

## 2017-10-06 NOTE — PROGRESS NOTES
No hepatits B or C infection. Liver enzymes remain mildly elevated, lose weight and we will re-check in a few months.

## 2017-11-09 ENCOUNTER — OFFICE VISIT (OUTPATIENT)
Dept: INTERNAL MEDICINE CLINIC | Age: 62
End: 2017-11-09

## 2017-11-09 VITALS
BODY MASS INDEX: 36.77 KG/M2 | RESPIRATION RATE: 20 BRPM | HEART RATE: 72 BPM | SYSTOLIC BLOOD PRESSURE: 126 MMHG | TEMPERATURE: 96.4 F | DIASTOLIC BLOOD PRESSURE: 71 MMHG | OXYGEN SATURATION: 98 % | HEIGHT: 76 IN | WEIGHT: 302 LBS

## 2017-11-09 DIAGNOSIS — Z86.711 HISTORY OF PULMONARY EMBOLISM: Primary | ICD-10-CM

## 2017-11-09 DIAGNOSIS — E66.9 OBESITY (BMI 30-39.9): ICD-10-CM

## 2017-11-09 DIAGNOSIS — R74.8 ELEVATED LIVER ENZYMES: ICD-10-CM

## 2017-11-09 DIAGNOSIS — T16.2XXA FOREIGN BODY OF LEFT EAR, INITIAL ENCOUNTER: ICD-10-CM

## 2017-11-09 LAB
INR BLD: 2.3
PT POC: 27.8 SECONDS
VALID INTERNAL CONTROL?: YES

## 2017-11-09 NOTE — PROGRESS NOTES
HISTORY OF PRESENT ILLNESS  Evangelina Gallardo is a 60 y.o. male. Weight Management   The history is provided by the patient. This is a chronic problem. The problem has been gradually improving (since starting diet). Pertinent negatives include no chest pain and no shortness of breath. Associated symptoms comments: Elevated liver enzymes. Ear Fullness    This is a new problem. The current episode started more than 1 week ago (since he put a cutip in there). The problem has not changed (better since starting diet.) since onset. Patient complains that the left ear is affected. There has been no fever. Pertinent negatives include no cough. The risk factors include smoking/tobacco exposure. Mostly vegetarian unprocessed food. Asks about affect on coumadin    Current Outpatient Prescriptions   Medication Sig Dispense Refill    cyclobenzaprine (FLEXERIL) 5 mg tablet Take 1 Tab by mouth three (3) times daily as needed for Muscle Spasm(s). 60 Tab 0    warfarin (COUMADIN) 2 mg tablet 3 mg (1.5 tabs) on Mon Weds Fri and Sat. 2 mg (1 Tab) on the other days 90 Tab 1    triamcinolone (NASACORT AQ) 55 mcg nasal inhaler 2 Sprays daily.  acetaminophen (TYLENOL) 500 mg tablet Take  by mouth every six (6) hours as needed for Pain.  BOOSTRIX TDAP 2.5-8-5 Lf-mcg-Lf/0.5mL susp susp            Review of Systems   Constitutional: Positive for weight gain. Negative for fever. HENT: Positive for tinnitus. Respiratory: Negative for cough and shortness of breath. Cardiovascular: Negative for chest pain. Gastrointestinal: Negative for blood in stool. Musculoskeletal: Negative for falls.        Physical Exam  Visit Vitals    /71 (BP 1 Location: Right arm, BP Patient Position: At rest)    Pulse 72    Temp 96.4 °F (35.8 °C) (Oral)    Resp 20    Ht 6' 4\" (1.93 m)    Wt 302 lb (137 kg)    SpO2 98%    BMI 36.76 kg/m2     WD WN male NAD  Left ear with FB  Heart RRR without murmers clicks or rubs  Lungs CTA  Abdo soft nontender  Ext no edema  FB washed out. ASSESSMENT and PLAN  Encounter Diagnoses   Name Primary?  History of pulmonary embolism Yes    Foreign body of left ear, initial encounter     Obesity (BMI 30-39. 9)     Elevated liver enzymes      Orders Placed This Encounter    AMB POC PT/INR     Discussed need to be consistent with green intake. Cont current diet  anticoag theraputic  Will repeat labvs when he f/u maribel with cont WL hopefully LFTs nl    Follow-up Disposition:  Return in about 2 months (around 1/9/2018) for routine follow up.

## 2017-11-09 NOTE — MR AVS SNAPSHOT
Visit Information Date & Time Provider Department Dept. Phone Encounter #  
 11/9/2017  7:45 AM MD Rosanne Rain Amendbekah Servin [id number] Follow-up Instructions Return in about 2 months (around 1/9/2018) for routine follow up. Upcoming Health Maintenance Date Due ZOSTER VACCINE AGE 60> 7/23/2014 COLONOSCOPY 1/1/2023 DTaP/Tdap/Td series (2 - Td) 5/2/2027 Allergies as of 11/9/2017  Review Complete On: 11/9/2017 By: Compa Campbell MD  
 No Known Allergies Current Immunizations  Reviewed on 10/5/2017 Name Date Tdap 5/2/2017 Not reviewed this visit You Were Diagnosed With   
  
 Codes Comments History of pulmonary embolism    -  Primary ICD-10-CM: Z86.711 ICD-9-CM: V12.55 Foreign body of left ear, initial encounter     ICD-10-CM: T16. 2XXA ICD-9-CM: 635, [id number] Obesity (BMI 30-39. 9)     ICD-10-CM: E66.9 ICD-9-CM: 278.00 Vitals BP Pulse Temp Resp Height(growth percentile) Weight(growth percentile) 126/71 (BP 1 Location: Right arm, BP Patient Position: At rest) 72 96.4 °F (35.8 °C) (Oral) 20 6' 4\" (1.93 m) 302 lb (137 kg) SpO2 BMI Smoking Status 98% 36.76 kg/m2 Former Smoker Vitals History BMI and BSA Data Body Mass Index Body Surface Area  
 36.76 kg/m 2 2.71 m 2 Preferred Pharmacy Pharmacy Name Phone [facility] [phone] Your Updated Medication List  
  
   
This list is accurate as of: 11/9/17  8:27 AM.  Always use your most recent med list.  
  
  
  
  
 acetaminophen 500 mg tablet Commonly known as:  TYLENOL Take  by mouth every six (6) hours as needed for Pain. BOOSTRIX TDAP 2.5-8-5 Lf-mcg-Lf/0.5mL Susp susp Generic drug:  Diphth, Pertus(Acell), Tetanus  
  
 cyclobenzaprine 5 mg tablet Commonly known as:  FLEXERIL  
 Take 1 Tab by mouth three (3) times daily as needed for Muscle Spasm(s). triamcinolone 55 mcg nasal inhaler Commonly known as:  NASACORT AQ  
2 Sprays daily. warfarin 2 mg tablet Commonly known as:  COUMADIN  
3 mg (1.5 tabs) on Mon Weds Fri and Sat. 2 mg (1 Tab) on the other days We Performed the Following AMB POC PT/INR [06957 CPT(R)] CT REMOVAL IMPACTED CERUMEN IRRIGATION/LVG UNILAT [83677 CPT(R)] Follow-up Instructions Return in about 2 months (around 1/9/2018) for routine follow up. Introducing [facility]! Tabitha Parisi introduces [employer] patient portal. Now you can access parts of your medical record, email your doctor's office, and request medication refills online. 1. In your internet browser, go to [email or web address] 2. Click on the First Time User? Click Here link in the Sign In box. You will see the New Member Sign Up page. 3. Enter your [employer] Access Code exactly as it appears below. You will not need to use this code after youve completed the sign-up process. If you do not sign up before the expiration date, you must request a new code. · [employer] Access Code: [email or web address] Expires: 11/29/2017 10:39 AM 
 
4. Enter the last four digits of your Social Security Number (xxxx) and Date of Birth (mm/dd/yyyy) as indicated and click Submit. You will be taken to the next sign-up page. 5. Create a Tunep[employer]ot ID. This will be your [employer] login ID and cannot be changed, so think of one that is secure and easy to remember. 6. Create a [employer] password. You can change your password at any time. 7. Enter your Password Reset Question and Answer. This can be used at a later time if you forget your password. 8. Enter your e-mail address. You will receive e-mail notification when new information is available in [address] 9. Click Sign Up. You can now view and download portions of your medical record. 10. Click the Download Summary menu link to download a portable copy of your medical information. If you have questions, please visit the Frequently Asked Questions section of the [employer] website. Remember, [employer] is NOT to be used for urgent needs. For medical emergencies, dial 911. Now available from your iPhone and Android! Please provide this summary of care documentation to your next provider. Your primary care clinician is listed as Isidro Teague. If you have any questions after today's visit, please call [phone].

## 2017-11-10 NOTE — PROGRESS NOTES
Chronic Conditions Addressed Today     1. History of pulmonary embolism - Primary     Relevant Orders     AMB POC PT/INR (Completed)      Acute Diagnoses Addressed Today     Foreign body of left ear, initial encounter            Relevant Orders        OH REMOVAL IMPACTED CERUMEN IRRIGATION/LVG UNILAT    Obesity (BMI 30-39. 9)        Elevated liver enzymes

## 2018-01-11 ENCOUNTER — OFFICE VISIT (OUTPATIENT)
Dept: INTERNAL MEDICINE CLINIC | Age: 63
End: 2018-01-11

## 2018-01-11 VITALS
DIASTOLIC BLOOD PRESSURE: 71 MMHG | HEART RATE: 77 BPM | TEMPERATURE: 97.8 F | OXYGEN SATURATION: 98 % | RESPIRATION RATE: 20 BRPM | WEIGHT: 299 LBS | HEIGHT: 76 IN | BODY MASS INDEX: 36.41 KG/M2 | SYSTOLIC BLOOD PRESSURE: 122 MMHG

## 2018-01-11 DIAGNOSIS — R74.8 ELEVATED LIVER ENZYMES: ICD-10-CM

## 2018-01-11 DIAGNOSIS — Z86.711 HISTORY OF PULMONARY EMBOLISM: Primary | ICD-10-CM

## 2018-01-11 DIAGNOSIS — F33.9 RECURRENT DEPRESSION (HCC): ICD-10-CM

## 2018-01-11 LAB
INR BLD: 2.9
PT POC: 34.5 SECONDS
VALID INTERNAL CONTROL?: YES

## 2018-01-11 RX ORDER — WARFARIN 2 MG/1
TABLET ORAL
Qty: 90 TAB | Refills: 1 | Status: SHIPPED | OUTPATIENT
Start: 2018-01-11 | End: 2018-04-09 | Stop reason: SDUPTHER

## 2018-01-11 NOTE — PATIENT INSTRUCTIONS
We discussed stratagies to reduce weight. Specifically discussed the Intemmitant Fasting diet, the [address]. Discussed weight loss programs as well as exercise, although emphasized caloric restriction. Consider keeping a food diary and seeing what you eat in a day and looking for where you can cut calories. Try taking a picture of everything you eat for a day. Phone apps can help witrh weight loss. Consider 'Lose It'  You must reduce liquid calories like soda and juices. Weight Watchers or other weight loss programs can be very helpful. There is no \"magic pill', but there some newly FDA approved medications for obesity.

## 2018-01-11 NOTE — PROGRESS NOTES
Subjective:     Justin Shaw is a 60 y.o. male who presents for follow up of pulmonary embolism. Frustrated with not really losing weight and c/o his elevated LFTs, been depressed lately. Father had cirrhosis and alcohol problems but he does not drink  New concerns: liver enzymes elevated he's not able to cont his veggie diet. Tends to lose weight in the summer when he is more active. Going to restart diet. His elevated liver enzyme is presumed to be fatty liver since all the other tests were negative. Takes his blood thinner as below. No bleeding issues tarry black stools. Current Outpatient Prescriptions   Medication Sig Dispense Refill    cyclobenzaprine (FLEXERIL) 5 mg tablet Take 1 Tab by mouth three (3) times daily as needed for Muscle Spasm(s). 60 Tab 0    warfarin (COUMADIN) 2 mg tablet 3 mg (1.5 tabs) on Mon Weds Fri and Sat. 2 mg (1 Tab) on the other days 90 Tab 1    triamcinolone (NASACORT AQ) 55 mcg nasal inhaler 2 Sprays daily.  BOOSTRIX TDAP 2.5-8-5 Lf-mcg-Lf/0.5mL susp susp       acetaminophen (TYLENOL) 500 mg tablet Take  by mouth every six (6) hours as needed for Pain. No Known Allergies    Diet and Lifestyle: generally follows a low fat low cholesterol diet, nonsmoker, alcohol intake none    Cardiovascular ROS: taking medications as instructed, no medication side effects noted, no TIA's, no chest pain on exertion, no dyspnea on exertion, no swelling of ankles. Review of Systems, additional:  Pertinent items are noted in HPI.       Social History   Substance Use Topics    Smoking status: Former Smoker     Packs/day: 0.25     Years: 15.00     Types: Cigars     Quit date: 12/12/2016    Smokeless tobacco: Never Used    Alcohol use No        Lab Results  Component Value Date/Time   Cholesterol, total 189 09/11/2017 09:05 AM   HDL Cholesterol 45 09/11/2017 09:05 AM   LDL, calculated 118 09/11/2017 09:05 AM   Triglyceride 129 09/11/2017 09:05 AM     Lab Results  Component Value Date/Time   ALT (SGPT) 30 10/05/2017 08:07 AM   AST (SGOT) 46 10/05/2017 08:07 AM   Alk. phosphatase 193 10/05/2017 08:07 AM   Bilirubin, direct 0.25 10/05/2017 08:07 AM   Bilirubin, total 0.8 10/05/2017 08:07 AM   Albumin 3.9 10/05/2017 08:07 AM   Protein, total 7.0 10/05/2017 08:07 AM   INR POC 2.9 01/11/2018 07:55 AM   PLATELET 786 24/92/2316 11:54 AM       Lab Results  Component Value Date/Time   GFR est non-AA 92 09/11/2017 09:05 AM   GFR est  09/11/2017 09:05 AM   Creatinine 0.89 09/11/2017 09:05 AM   BUN 11 09/11/2017 09:05 AM   Sodium 140 09/11/2017 09:05 AM   Potassium 4.5 09/11/2017 09:05 AM   Chloride 104 09/11/2017 09:05 AM   CO2 19 09/11/2017 09:05 AM     Lab Results   Component Value Date/Time    Glucose 94 09/11/2017 09:05 AM             Objective:     Physical exam significant for the following: WNL    Visit Vitals    /71 (BP 1 Location: Left arm, BP Patient Position: At rest)    Pulse 77    Temp 97.8 °F (36.6 °C) (Oral)    Resp 20    Ht 6' 4\" (1.93 m)    Wt 299 lb (135.6 kg)    SpO2 98%    BMI 36.4 kg/m2     Appearance: alert, well appearing, and in no distress. General exam: CVS exam BP noted to be well controlled today in office, S1, S2 normal, no gallop, no murmur, chest clear, no JVD, no HSM, no edema. .   Assessment/Plan:       ICD-10-CM ICD-9-CM    1. History of pulmonary embolism Z86.711 V12.55 AMB POC PT/INR   2. Elevated liver enzymes R74.8 790.5    3. Recurrent depression (HCC) F33.9 296.30      Orders Placed This Encounter    AMB POC PT/INR    warfarin (COUMADIN) 2 mg tablet     Sig: 3 mg (1.5 tabs) on Mon Weds Fri  2 mg (1 Tab) on the other days     Dispense:  90 Tab     Refill:  1     Suggested either restarting depression medications or counseling. He is declining them. Will work on his depression himself.   Spent some time discussing elevated liver enzymes and fatty liver disease and how in general they do not progress to cirrhosis. He will try to follow-up with a diet, handout given to him. Pulmonary embolisms stable, continue Coumadin slight reduction in dosage as above. Follow-up Disposition:  Return in about 4 weeks (around 2/8/2018) for routine follow up, nurse visit.   2 months with me

## 2018-01-11 NOTE — PROGRESS NOTES
Work up of abnormal liver tests - check on PT/ INR  - current Coumadin dose is 3mg on Mon , Wed, Fri - 2 mg all other days - PT/ INR 34.5/ 2.9 Rakel Pod, LPN  2/15/7747  0:85 AM

## 2018-01-11 NOTE — PROGRESS NOTES
I have reviewed/discussed the above normal BMI with the patient. I have recommended the following interventions: dietary management education, guidance, and counseling and prescribed dietary intake . Joyce Gaston

## 2018-01-11 NOTE — MR AVS SNAPSHOT
Visit Information Date & Time Provider Department Dept. Phone Encounter #  
 1/11/2018  7:45 AM Ifrah Crawley MD [facility] Primary Care [phone] [id number] Follow-up Instructions Return in about 4 weeks (around 2/8/2018) for routine follow up, nurse visit. Upcoming Health Maintenance Date Due ZOSTER VACCINE AGE 60> 7/23/2014 COLONOSCOPY 1/1/2023 DTaP/Tdap/Td series (2 - Td) 5/2/2027 Allergies as of 1/11/2018  Review Complete On: 1/11/2018 By: Falguni Fields LPN No Known Allergies Current Immunizations  Reviewed on 10/5/2017 Name Date Tdap 5/2/2017 Not reviewed this visit You Were Diagnosed With   
  
 Codes Comments History of pulmonary embolism    -  Primary ICD-10-CM: Z86.711 ICD-9-CM: V12.55 Elevated liver enzymes     ICD-10-CM: R74.8 ICD-9-CM: 790.5 Recurrent depression (RUSTca 75.)     ICD-10-CM: F33.9 ICD-9-CM: 296.30 Vitals BP Pulse Temp Resp Height(growth percentile) Weight(growth percentile) 122/71 (BP 1 Location: Left arm, BP Patient Position: At rest) 77 97.8 °F (36.6 °C) (Oral) 20 6' 4\" (1.93 m) 299 lb (135.6 kg) SpO2 BMI Smoking Status 98% 36.4 kg/m2 Former Smoker BMI and BSA Data Body Mass Index Body Surface Area  
 36.4 kg/m 2 2.7 m 2 Your Updated Medication List  
  
   
This list is accurate as of: 1/11/18  8:12 AM.  Always use your most recent med list.  
  
  
  
  
 acetaminophen 500 mg tablet Commonly known as:  TYLENOL Take  by mouth every six (6) hours as needed for Pain. BOOSTRIX TDAP 2.5-8-5 Lf-mcg-Lf/0.5mL Susp susp Generic drug:  Diphth, Pertus(Acell), Tetanus  
  
 cyclobenzaprine 5 mg tablet Commonly known as:  FLEXERIL Take 1 Tab by mouth three (3) times daily as needed for Muscle Spasm(s). triamcinolone 55 mcg nasal inhaler Commonly known as:  NASACORT AQ  
2 Sprays daily. warfarin 2 mg tablet Commonly known as:  COUMADIN  
 3 mg (1.5 tabs) on Mon Weds Fri  2 mg (1 Tab) on the other days Prescriptions Printed Refills  
 warfarin (COUMADIN) 2 mg tablet 1 Sig: 3 mg (1.5 tabs) on Mon Weds Fri  2 mg (1 Tab) on the other days Class: Print We Performed the Following AMB POC PT/INR [39151 CPT(R)] Follow-up Instructions Return in about 4 weeks (around 2/8/2018) for routine follow up, nurse visit. Patient Instructions We discussed stratagies to reduce weight. Specifically discussed the Intemmitant Fasting diet, the [address] [place] [address] Avenue. Discussed weight loss programs as well as exercise, although emphasized caloric restriction. Consider keeping a food diary and seeing what you eat in a day and looking for where you can cut calories. Try taking a picture of everything you eat for a day. Phone apps can help witrh weight loss. Consider 'Lose It' You must reduce liquid calories like soda and juices. Weight Watchers or other weight loss programs can be very helpful. There is no \"magic pill', but there some newly FDA approved medications for obesity. Introducing [facility]! [employer] introduces Animeeple patient portal. Now you can access parts of your medical record, email your doctor's office, and request medication refills online. 1. In your internet browser, go to [email or web address] 2. Click on the First Time User? Click Here link in the Sign In box. You will see the New Member Sign Up page. 3. Enter your Animeeple Access Code exactly as it appears below. You will not need to use this code after youve completed the sign-up process. If you do not sign up before the expiration date, you must request a new code. · Animeeple Access Code: [email or web address] Expires: 4/11/2018  7:40 AM 
 
4. Enter the last four digits of your Social Security Number (xxxx) and Date of Birth (mm/dd/yyyy) as indicated and click Submit.  You will be taken to the next sign-up page. 5. Create a Mine ID. This will be your Mine login ID and cannot be changed, so think of one that is secure and easy to remember. 6. Create a Mine password. You can change your password at any time. 7. Enter your Password Reset Question and Answer. This can be used at a later time if you forget your password. 8. Enter your e-mail address. You will receive e-mail notification when new information is available in [address] 9. Click Sign Up. You can now view and download portions of your medical record. 10. Click the Download Summary menu link to download a portable copy of your medical information. If you have questions, please visit the Frequently Asked Questions section of the Mine website. Remember, Mine is NOT to be used for urgent needs. For medical emergencies, dial 911. Now available from your iPhone and Android! Please provide this summary of care documentation to your next provider. Your primary care clinician is listed as Octavia Adam. If you have any questions after today's visit, please call [phone].

## 2018-02-26 ENCOUNTER — CLINICAL SUPPORT (OUTPATIENT)
Dept: INTERNAL MEDICINE CLINIC | Age: 63
End: 2018-02-26

## 2018-02-26 DIAGNOSIS — Z86.711 HISTORY OF PULMONARY EMBOLISM: Primary | ICD-10-CM

## 2018-02-26 LAB
INR BLD: 2.1
PT POC: 24.9 SECONDS
VALID INTERNAL CONTROL?: YES

## 2018-02-26 NOTE — PROGRESS NOTES
Chief Complaint   Patient presents with    Coagulation disorder     As per verbal order from Dr. Oksana Lira, PT/INR was done    PT/INR  24.9/2.1      No new orders given to pt, return to recheck in one month.

## 2018-04-09 ENCOUNTER — CLINICAL SUPPORT (OUTPATIENT)
Dept: INTERNAL MEDICINE CLINIC | Age: 63
End: 2018-04-09

## 2018-04-09 VITALS
OXYGEN SATURATION: 98 % | SYSTOLIC BLOOD PRESSURE: 138 MMHG | DIASTOLIC BLOOD PRESSURE: 77 MMHG | HEART RATE: 80 BPM | RESPIRATION RATE: 16 BRPM

## 2018-04-09 DIAGNOSIS — Z86.711 HISTORY OF PULMONARY EMBOLISM: Primary | ICD-10-CM

## 2018-04-09 LAB
INR BLD: 1.8
INR, EXTERNAL: 1.8 (ref 2–3)
PT POC: 21.2 SECONDS
VALID INTERNAL CONTROL?: YES

## 2018-04-09 RX ORDER — WARFARIN 2 MG/1
TABLET ORAL
Qty: 90 TAB | Refills: 1 | Status: SHIPPED | OUTPATIENT
Start: 2018-04-09 | End: 2021-06-15 | Stop reason: DRUGHIGH

## 2018-04-09 NOTE — MR AVS SNAPSHOT
[address] [facility] 
[phone] Patient: Abigail Murphy MRN: [id number] Visit Information Date & Time Provider Department Dept. Phone Encounter #  
 4/9/2018  9:20 AM NURSE 235 [facility] [address] Primary Care [phone] [id number] Upcoming Health Maintenance Date Due ZOSTER VACCINE AGE 60> 7/23/2014 COLONOSCOPY 1/1/2023 DTaP/Tdap/Td series (2 - Td) 5/2/2027 Allergies as of 4/9/2018  Review Complete On: 4/9/2018 By: Fransisco Rodriguez LPN No Known Allergies Current Immunizations  Reviewed on 10/5/2017 Name Date Tdap 5/2/2017 Not reviewed this visit You Were Diagnosed With   
  
 Codes Comments History of pulmonary embolism    -  Primary ICD-10-CM: Z86.711 ICD-9-CM: V12.55 Vitals BP Pulse Resp SpO2 Smoking Status 138/77 (BP 1 Location: Left arm, BP Patient Position: At rest) 80 16 98% Former Smoker Your Updated Medication List  
  
   
This list is accurate as of 4/9/18 11:08 AM.  Always use your most recent med list.  
  
  
  
  
 acetaminophen 500 mg tablet Commonly known as:  TYLENOL Take  by mouth every six (6) hours as needed for Pain. BOOSTRIX TDAP 2.5-8-5 Lf-mcg-Lf/0.5mL Susp susp Generic drug:  diphtheria-pertussis (acellular)-tetanus  
  
 cyclobenzaprine 5 mg tablet Commonly known as:  FLEXERIL Take 1 Tab by mouth three (3) times daily as needed for Muscle Spasm(s). triamcinolone 55 mcg nasal inhaler Commonly known as:  NASACORT AQ  
2 Sprays daily. warfarin 2 mg tablet Commonly known as:  COUMADIN  
3 mg (1.5 tabs) on Mon Weds Fri  2 mg (1 Tab) on the other days April 2018 Details Sun Mon Tue Wed Thu Fri Sat  
  1  
  
  
  
   2  
  
  
  
   3  
  
  
  
   4  
  
  
  
   5  
  
  
  
   6  
  
  
  
   7  
  
  
  
  
  8  
  
  
  
   9  
  
3 mg See details    10  
  
 2 mg  
  
   11  
  
3 mg  
  
   12  
  
2 mg  
  
   13  
  
3 mg  
  
   14  
  
2 mg  
  
  
  15  
  
2 mg  
  
   16  
  
3 mg  
  
   17  
  
2 mg 18  
  
3 mg  
  
   19  
  
2 mg  
  
   20  
  
3 mg  
  
   21  
  
2 mg  
  
  
  22  
  
2 mg  
  
   23  
  
3 mg  
  
   24  
  
2 mg 25  
  
3 mg  
  
   26  
  
2 mg  
  
   27  
  
3 mg  
  
   28  
  
2 mg  
  
  
  29  
  
2 mg  
  
   30  
  
2 mg Date Details 04/09 This INR check INR: 1.8 How to take your warfarin dose To take:  2 mg Take one of the 2 mg tablets. To take:  3 mg Take one and a half of the 2 mg tablets. May 2018 Details Harrison Morin Tue Wed Thu Fri Sat 1  
  
2 mg 2  
  
2 mg 3  
  
2 mg 4  
  
2 mg 5  
  
2 mg 6  
  
2 mg  
  
   7  
  
2 mg 8  
  
2 mg  
  
   9  
  
2 mg  
  
   10  
  
  
  
   11  
  
  
  
   12  
  
  
  
  
  13  
  
  
  
   14  
  
  
  
   15  
  
  
  
   16  
  
  
  
   17  
  
  
  
   18  
  
  
  
   19  
  
  
  
  
  20  
  
  
  
   21  
  
  
  
   22  
  
  
  
   23  
  
  
  
   24  
  
  
  
   25  
  
  
  
   26  
  
  
  
  
  27  
  
  
  
   28  
  
  
  
   29  
  
  
  
   30  
  
  
  
   31  
  
  
  
    
 Date Details No additional details Date of next INR:  5/9/2018 How to take your warfarin dose To take:  2 mg Take one of the 2 mg tablets. We Performed the Following AMB POC PT/INR [87945 CPT(R)] AMB PT/INR EXTERNAL [YUS10400 CPT(R)] Comments: This order was created through the anticoagulation tracking navigator section. Introducing [facility]! [employer] introduces TMS patient portal. Now you can access parts of your medical record, email your doctor's office, and request medication refills online. 1. In your internet browser, go to [email or web address] 2. Click on the First Time User? Click Here link in the Sign In box. You will see the New Member Sign Up page. 3. Enter your GT [employer] Access Code exactly as it appears below. You will not need to use this code after youve completed the sign-up process. If you do not sign up before the expiration date, you must request a new code. · GT Channel Access Code: [email or web address] Expires: 4/11/2018  8:40 AM 
 
4. Enter the last four digits of your Social Security Number (xxxx) and Date of Birth (mm/dd/yyyy) as indicated and click Submit. You will be taken to the next sign-up page. 5. Create a GT Channel ID. This will be your GT Channel login ID and cannot be changed, so think of one that is secure and easy to remember. 6. Create a GT Channel password. You can change your password at any time. 7. Enter your Password Reset Question and Answer. This can be used at a later time if you forget your password. 8. Enter your e-mail address. You will receive e-mail notification when new information is available in [address] 9. Click Sign Up. You can now view and download portions of your medical record. 10. Click the Download Summary menu link to download a portable copy of your medical information. If you have questions, please visit the Frequently Asked Questions section of the [employer] website. Remember, [employer] is NOT to be used for urgent needs. For medical emergencies, dial 911. Now available from your iPhone and Android! Please provide this summary of care documentation to your next provider. Your primary care clinician is listed as Selene Dillon. If you have any questions after today's visit, please call [phone].

## 2018-04-09 NOTE — TELEPHONE ENCOUNTER
Last office visit:  1/11/18 / last PT check 4/9/18 (1.8)  Last filled:  1/11/18 # 90 X 1 refill  No changes  No follow up scheduled

## 2018-04-09 NOTE — PROGRESS NOTES
Patient in for anticoagulation check up - current Coumadin dose is 3mg (1.5 tabs) on Mon, Wed Fri - 2mg (1 tab) on tue, thu, sat, sun - PT/ INR 21.2/ 1.8 - denies missing any doses - last INR check done on 2/26/18 at 2.1 - patient also states he needs a refill of his Coumadin sent to [address] [place] [address] in [address] [place] [address] - per order of Elvis Frey MD no change in Coumadin dosage, and recheck PT level in 1 month - notified patient    Brenda Singh LPN  2/4/4401  99:95 AM

## 2018-06-07 ENCOUNTER — CLINICAL SUPPORT (OUTPATIENT)
Dept: INTERNAL MEDICINE CLINIC | Age: 63
End: 2018-06-07

## 2018-06-07 VITALS
OXYGEN SATURATION: 97 % | TEMPERATURE: 96.9 F | RESPIRATION RATE: 16 BRPM | SYSTOLIC BLOOD PRESSURE: 127 MMHG | HEIGHT: 76 IN | DIASTOLIC BLOOD PRESSURE: 77 MMHG | HEART RATE: 71 BPM

## 2018-06-07 DIAGNOSIS — Z86.711 HISTORY OF PULMONARY EMBOLISM: Primary | ICD-10-CM

## 2018-06-07 LAB
INR BLD: 2.3
PT POC: 27.7 SECONDS
VALID INTERNAL CONTROL?: YES

## 2018-06-07 NOTE — PROGRESS NOTES
Chief Complaint   Patient presents with    Coagulation disorder     Abigail Murphy is a 60 y.o. male who presents today for Anticoagulation monitoring. Indication: PE  INR Goal: 2.0-3.0. Current dose:  Coumadin 3 mg M, W, F and 2 mg rest of the time. Missed Coumadin Doses:  None  Medication Changes:  no  Dietary Changes:  no    Symptoms: taking coumadin appropriately without any bleeding. Latest INRs:  Lab Results   Component Value Date/Time    INR, External 1.8 (A) 04/09/2018 11:03 AM    INR POC 1.8 04/09/2018 11:08 AM    INR POC 2.1 02/26/2018 10:01 AM    INR POC 2.9 01/11/2018 07:55 AM        New Coumadin dose:.current treatment plan is effective, no change in therapy per Dr. Santhosh Kulkarni. Next check to be scheduled for  4 weeks per Dr. Santhosh Kulkarni.

## 2018-08-23 ENCOUNTER — HOSPITAL ENCOUNTER (OUTPATIENT)
Dept: INTERVENTIONAL RADIOLOGY/VASCULAR | Age: 63
Discharge: HOME | End: 2018-08-23
Attending: INTERNAL MEDICINE
Payer: COMMERCIAL

## 2018-08-23 VITALS
HEART RATE: 63 BPM | WEIGHT: 300 LBS | BODY MASS INDEX: 37.3 KG/M2 | SYSTOLIC BLOOD PRESSURE: 134 MMHG | RESPIRATION RATE: 18 BRPM | OXYGEN SATURATION: 100 % | TEMPERATURE: 98 F | DIASTOLIC BLOOD PRESSURE: 75 MMHG | HEIGHT: 75 IN

## 2018-08-23 DIAGNOSIS — K74.60 CIRRHOSIS (HCC): ICD-10-CM

## 2018-08-23 LAB
ERYTHROCYTE [DISTWIDTH] IN BLOOD BY AUTOMATED COUNT: 13.9 % (ref 11.5–14.5)
HCT VFR BLD AUTO: 41.9 % (ref 36.6–50.3)
HGB BLD-MCNC: 14.8 G/DL (ref 12.1–17)
INR PPP: 1.3 (ref 0.9–1.1)
MCH RBC QN AUTO: 33.9 PG (ref 26–34)
MCHC RBC AUTO-ENTMCNC: 35.3 G/DL (ref 30–36.5)
MCV RBC AUTO: 96.1 FL (ref 80–99)
NRBC # BLD: 0 K/UL (ref 0–0.01)
NRBC BLD-RTO: 0 PER 100 WBC
PLATELET # BLD AUTO: 108 K/UL (ref 150–400)
PMV BLD AUTO: 9.3 FL (ref 8.9–12.9)
PROTHROMBIN TIME: 12.8 SEC (ref 9–11.1)
RBC # BLD AUTO: 4.36 M/UL (ref 4.1–5.7)
WBC # BLD AUTO: 5.8 K/UL (ref 4.1–11.1)

## 2018-08-23 PROCEDURE — 36012 PLACE CATHETER IN VEIN: CPT

## 2018-08-23 PROCEDURE — 74011000250 HC RX REV CODE- 250: Performed by: STUDENT IN AN ORGANIZED HEALTH CARE EDUCATION/TRAINING PROGRAM

## 2018-08-23 PROCEDURE — C1894 INTRO/SHEATH, NON-LASER: HCPCS

## 2018-08-23 PROCEDURE — 88313 SPECIAL STAINS GROUP 2: CPT | Performed by: STUDENT IN AN ORGANIZED HEALTH CARE EDUCATION/TRAINING PROGRAM

## 2018-08-23 PROCEDURE — 36415 COLL VENOUS BLD VENIPUNCTURE: CPT | Performed by: INTERNAL MEDICINE

## 2018-08-23 PROCEDURE — C2628 CATHETER, OCCLUSION: HCPCS

## 2018-08-23 PROCEDURE — 74011250636 HC RX REV CODE- 250/636: Performed by: STUDENT IN AN ORGANIZED HEALTH CARE EDUCATION/TRAINING PROGRAM

## 2018-08-23 PROCEDURE — C1769 GUIDE WIRE: HCPCS

## 2018-08-23 PROCEDURE — 99152 MOD SED SAME PHYS/QHP 5/>YRS: CPT

## 2018-08-23 PROCEDURE — C1892 INTRO/SHEATH,FIXED,PEEL-AWAY: HCPCS

## 2018-08-23 PROCEDURE — 85027 COMPLETE CBC AUTOMATED: CPT | Performed by: INTERNAL MEDICINE

## 2018-08-23 PROCEDURE — 88307 TISSUE EXAM BY PATHOLOGIST: CPT | Performed by: STUDENT IN AN ORGANIZED HEALTH CARE EDUCATION/TRAINING PROGRAM

## 2018-08-23 PROCEDURE — 85610 PROTHROMBIN TIME: CPT | Performed by: INTERNAL MEDICINE

## 2018-08-23 PROCEDURE — 77030014109 HC TRNSDUC SP PROC MRTM -B

## 2018-08-23 PROCEDURE — 77030037218

## 2018-08-23 PROCEDURE — 74011636320 HC RX REV CODE- 636/320: Performed by: STUDENT IN AN ORGANIZED HEALTH CARE EDUCATION/TRAINING PROGRAM

## 2018-08-23 RX ORDER — FENTANYL CITRATE 50 UG/ML
100 INJECTION, SOLUTION INTRAMUSCULAR; INTRAVENOUS
Status: DISCONTINUED | OUTPATIENT
Start: 2018-08-23 | End: 2018-08-27 | Stop reason: HOSPADM

## 2018-08-23 RX ORDER — LIDOCAINE HYDROCHLORIDE 20 MG/ML
50 INJECTION, SOLUTION INFILTRATION; PERINEURAL ONCE
Status: COMPLETED | OUTPATIENT
Start: 2018-08-23 | End: 2018-08-23

## 2018-08-23 RX ORDER — HEPARIN SODIUM 200 [USP'U]/100ML
400 INJECTION, SOLUTION INTRAVENOUS ONCE
Status: DISPENSED | OUTPATIENT
Start: 2018-08-23 | End: 2018-08-23

## 2018-08-23 RX ORDER — MIDAZOLAM HYDROCHLORIDE 1 MG/ML
5 INJECTION, SOLUTION INTRAMUSCULAR; INTRAVENOUS
Status: DISCONTINUED | OUTPATIENT
Start: 2018-08-23 | End: 2018-08-27 | Stop reason: HOSPADM

## 2018-08-23 RX ORDER — SODIUM CHLORIDE 9 MG/ML
25 INJECTION, SOLUTION INTRAVENOUS CONTINUOUS
Status: DISCONTINUED | OUTPATIENT
Start: 2018-08-23 | End: 2018-08-27 | Stop reason: HOSPADM

## 2018-08-23 RX ADMIN — IOPAMIDOL 35 ML: 755 INJECTION, SOLUTION INTRAVENOUS at 10:48

## 2018-08-23 RX ADMIN — FENTANYL CITRATE 25 MCG: 50 INJECTION, SOLUTION INTRAMUSCULAR; INTRAVENOUS at 10:41

## 2018-08-23 RX ADMIN — FENTANYL CITRATE 25 MCG: 50 INJECTION, SOLUTION INTRAMUSCULAR; INTRAVENOUS at 11:05

## 2018-08-23 RX ADMIN — LIDOCAINE HYDROCHLORIDE 200 MG: 20 INJECTION, SOLUTION INFILTRATION; PERINEURAL at 10:20

## 2018-08-23 RX ADMIN — FENTANYL CITRATE 25 MCG: 50 INJECTION, SOLUTION INTRAMUSCULAR; INTRAVENOUS at 10:47

## 2018-08-23 RX ADMIN — FENTANYL CITRATE 25 MCG: 50 INJECTION, SOLUTION INTRAMUSCULAR; INTRAVENOUS at 11:01

## 2018-08-23 RX ADMIN — MIDAZOLAM 1 MG: 1 INJECTION INTRAMUSCULAR; INTRAVENOUS at 10:41

## 2018-08-23 RX ADMIN — FENTANYL CITRATE 25 MCG: 50 INJECTION, SOLUTION INTRAMUSCULAR; INTRAVENOUS at 10:52

## 2018-08-23 RX ADMIN — MIDAZOLAM 2 MG: 1 INJECTION INTRAMUSCULAR; INTRAVENOUS at 10:35

## 2018-08-23 RX ADMIN — FENTANYL CITRATE 25 MCG: 50 INJECTION, SOLUTION INTRAMUSCULAR; INTRAVENOUS at 10:35

## 2018-08-23 RX ADMIN — MIDAZOLAM 1 MG: 1 INJECTION INTRAMUSCULAR; INTRAVENOUS at 10:47

## 2018-08-23 RX ADMIN — SODIUM CHLORIDE 25 ML/HR: 900 INJECTION, SOLUTION INTRAVENOUS at 10:27

## 2018-08-23 RX ADMIN — MIDAZOLAM 1 MG: 1 INJECTION INTRAMUSCULAR; INTRAVENOUS at 11:01

## 2018-08-23 RX ADMIN — SODIUM BICARBONATE 1 ML: 0.2 INJECTION, SOLUTION INTRAVENOUS at 10:20

## 2018-08-23 NOTE — PROGRESS NOTES
Pt resting comfortably on stretcher. VSS. No C/O pain. Dressing to site D&I. No bleeding or hematoma noted to site. MD has assessed pt and per MD pt may be discharged. IV D/Cd. Discharge instructions given. Copy on chart and copy given to pt. Pt and wife verbalize understanding. Pt taken to car by wheelchair and taken home by wife. NAD noted at time of discharge.

## 2018-08-23 NOTE — IP AVS SNAPSHOT
[address] 
[phone] Patient: Tia Sinha MRN: [id number] WBE:4[birth year]71[birth year] About your hospitalization You were admitted on:  August 23, 2018 You last received care in the:  [facility] RAD ANGIO IR You were discharged on:  August 23, 2018 Why you were hospitalized Your primary diagnosis was:  Not on File Follow-up Information None Discharge Orders None A check kaitlin indicates which time of day the medication should be taken. My Medications ASK your doctor about these medications Instructions Each Dose to Equal  
 Morning Noon Evening Bedtime  
 acetaminophen 500 mg tablet Commonly known as:  TYLENOL Your last dose was: Your next dose is: Take  by mouth every six (6) hours as needed for Pain. BOOSTRIX TDAP 2.5-8-5 Lf-mcg-Lf/0.5mL Susp susp Generic drug:  diphtheria-pertussis (acellular)-tetanus Your last dose was: Your next dose is:    
   
   
      
   
   
   
  
 cyclobenzaprine 5 mg tablet Commonly known as:  FLEXERIL Your last dose was: Your next dose is: Take 1 Tab by mouth three (3) times daily as needed for Muscle Spasm(s). 5 mg  
    
   
   
   
  
 triamcinolone 55 mcg nasal inhaler Commonly known as:  NASACORT AQ Your last dose was: Your next dose is: 2 Sprays daily. 2 Spray  
    
   
   
   
  
 warfarin 2 mg tablet Commonly known as:  COUMADIN Your last dose was: Your next dose is:    
   
   
 3 mg (1.5 tabs) on Mon Weds Fri  2 mg (1 Tab) on the other days Discharge Instructions [facility] Special Procedures Department 
[phone] Transjugular Liver Biopsy Discharge Instructions Radiologist: Dr. Tete Ruggiero Date:  8/23/2018 Keep dressing clean and dry, do not get it wet today You may have an aching pain in your abdominal area tonight. Take Tylenol, as directed on the label, for pain. Avoid ibuprofen and aspirin for the next 48 hours as these drugs may cause you to bleed. Do not lift anything heavier than a small grocery bag (10 pounds) for the next 5 days. If you experience severe sweating, severe abdominal pain, dizziness, go immediately to the Emergency Room. Mild pain under the left collarbone is normal.  
 
Watch for signs of infection: 
a. Redness 
b. Fever/Chills 
c. Increased pain 
d. Drainage 
e. Pus Please call your physician if you note any signs of infection. If you note bleeding hold pressure to the Band-Aid dressing at your neck for at least 5 minutes. If you are unable to stop the bleeding go to the nearest Emergency Department immediately, or call 911. Do not lie flat for 24 hours Introducing [facility]! [facility] introduces [employer] patient portal. Now you can access parts of your medical record, email your doctor's office, and request medication refills online. 1. In your internet browser, go to [email or web address] 2. Click on the First Time User? Click Here link in the Sign In box. You will see the New Member Sign Up page. 3. Enter your [employer] Access Code exactly as it appears below. You will not need to use this code after youve completed the sign-up process. If you do not sign up before the expiration date, you must request a new code. · [employer] Access Code: [email or web address] Expires: 9/5/2018  9:58 AM 
 
4. Enter the last four digits of your Social Security Number (xxxx) and Date of Birth (mm/dd/yyyy) as indicated and click Submit. You will be taken to the next sign-up page. 5. Create a Rendeevoo ID. This will be your Rendeevoo login ID and cannot be changed, so think of one that is secure and easy to remember. 6. Create a Rendeevoo password. You can change your password at any time. 7. Enter your Password Reset Question and Answer. This can be used at a later time if you forget your password. 8. Enter your e-mail address. You will receive e-mail notification when new information is available in [facility] 9. Click Sign Up. You can now view and download portions of your medical record. 10. Click the Download Summary menu link to download a portable copy of your medical information. If you have questions, please visit the Frequently Asked Questions section of the Rendeevoo website. Remember, Rendeevoo is NOT to be used for urgent needs. For medical emergencies, dial 911. Now available from your iPhone and Android! Introducing [employer] As a [employer] patient, I wanted to make you aware of our electronic visit tool called [employer]. [employer] 24/7 allows you to connect within minutes with a medical provider 24 hours a day, seven days a week via a mobile device or tablet or logging into a secure website from your computer. You can access Won Vital from anywhere in the [place]. A virtual visit might be right for you when you have a simple condition and feel like you just dont want to get out of bed, or cant get away from work for an appointment, when your regular [employer] provider is not available (evenings, weekends or holidays), or when youre out of town and need minor care. Electronic visits cost only $49 and if the [employer] 24/7 provider determines a prescription is needed to treat your condition, one can be electronically transmitted to a nearby pharmacy*. Please take a moment to enroll today if you have not already done so. The enrollment process is free and takes just a few minutes.   To enroll, please download the [employer] 24/7 vitaly to your tablet or phone, or visit [email or web address] to enroll on your computer. And, as an [address] [place] [address] patient with a [employer] account, the results of your visits will be scanned into your electronic medical record and your primary care provider will be able to view the scanned results. We urge you to continue to see your regular [employer] provider for your ongoing medical care. And while your primary care provider may not be the one available when you seek a [employer] virtual visit, the peace of mind you get from getting a real diagnosis real time can be priceless. For more information on [employer], view our Frequently Asked Questions (FAQs) at [email or web address]. Sincerely, 
 
Reanna Teixeira MD 
Chief Medical Officer [employer] *:  certain medications cannot be prescribed via [employer] Providers Seen During Your Hospitalization Provider Specialty Primary office phone Sha Mims MD Gastroenterology [phone] Your Primary Care Physician (PCP) Primary Care Physician Office Phone Office Fax Hiren Lopez [phone] You are allergic to the following No active allergies Recent Documentation Height Weight BMI Smoking Status 1.905 m 136.1 kg 37.5 kg/m2 Former Smoker Emergency Contacts Name Discharge Info Relation Home Work Mobile [address] CAREGIVER [3] Spouse [3] [phone] Patient Belongings The following personal items are in your possession at time of discharge: 
     Visual Aid: None Please provide this summary of care documentation to your next provider. Signatures-by signing, you are acknowledging that this After Visit Summary has been reviewed with you and you have received a copy. Patient Signature:  ____________________________________________________________ Date:  ____________________________________________________________  
  
[facility]  Provider Signature:  ____________________________________________________________ Date:  ____________________________________________________________

## 2018-08-23 NOTE — PROGRESS NOTES
Pt arrived in xray recovery for procedure. Confirmed with Dr. Brigid Reilly office. Pressures need to be obtained during procedure.

## 2018-08-23 NOTE — PROGRESS NOTES
Name of procedure: Transjugular Liver Biopsy    Complications, if any, r/t procedure: none    Sedation medications given: 5 mg Versed, 150 mcg Fentanyl    Sedation tolerated: well    VS : Stable    Pt tolerated procedure well. VSS. No C/O pain. Dressing to site D&I. No bleeding or hematoma noted to site. HOB elevated. Pt resting comfortably on stretcher. NAD noted. Wife at bedside. Will monitor.

## 2018-08-23 NOTE — DISCHARGE INSTRUCTIONS
[facility]  Special Procedures Department  [phone]    Transjugular Liver Biopsy Discharge Instructions    Radiologist: Dr. Brianda Bahena    Date:  8/23/2018    Keep dressing clean and dry, do not get it wet today  You may have an aching pain in your abdominal area tonight. Take Tylenol, as directed on the label, for pain. Avoid ibuprofen and aspirin for the next 48 hours as these drugs may cause you to bleed. Do not lift anything heavier than a small grocery bag (10 pounds) for the next 5 days. If you experience severe sweating, severe abdominal pain, dizziness, go immediately to the Emergency Room. Mild pain under the left collarbone is normal.     Watch for signs of infection:  a. Redness  b. Fever/Chills  c. Increased pain  d. Drainage  e. Pus  Please call your physician if you note any signs of infection. If you note bleeding hold pressure to the Band-Aid dressing at your neck for at least 5 minutes. If you are unable to stop the bleeding go to the nearest Emergency Department immediately, or call 911.                                                                                                                                     Do not lie flat for 24 hours

## 2018-08-23 NOTE — H&P
Radiology History and Physical    Patient: Zhane Dang 60 y.o. male       Chief Complaint: No chief complaint on file. History of Present Illness: Elevated liver enzymes. Presenting for transjugular liver bx with pressure measurements. History:    Past Medical History:   Diagnosis Date    Depression     Headache     Sarcoid     Full recovery     Family History   Problem Relation Age of Onset    Diabetes Mother     Stroke Mother     Cancer Father      Social History     Social History    Marital status:      Spouse name: N/A    Number of children: 3    Years of education: N/A     Occupational History    retired      Social History Main Topics    Smoking status: Former Smoker     Packs/day: 0.25     Years: 15.00     Types: Cigars     Quit date: 12/12/2016    Smokeless tobacco: Never Used    Alcohol use No    Drug use: No    Sexual activity: Yes     Partners: Female     Other Topics Concern    Not on file     Social History Narrative    Lives with wife        Allergies: No Known Allergies    Current Medications:  Current Outpatient Prescriptions   Medication Sig    warfarin (COUMADIN) 2 mg tablet 3 mg (1.5 tabs) on Mon Weds Fri  2 mg (1 Tab) on the other days    cyclobenzaprine (FLEXERIL) 5 mg tablet Take 1 Tab by mouth three (3) times daily as needed for Muscle Spasm(s).  BOOSTRIX TDAP 2.5-8-5 Lf-mcg-Lf/0.5mL susp susp     triamcinolone (NASACORT AQ) 55 mcg nasal inhaler 2 Sprays daily.  acetaminophen (TYLENOL) 500 mg tablet Take  by mouth every six (6) hours as needed for Pain. Current Facility-Administered Medications   Medication Dose Route Frequency    0.9% sodium chloride infusion  25 mL/hr IntraVENous CONTINUOUS    midazolam (VERSED) injection 5 mg  5 mg IntraVENous Multiple    fentaNYL citrate (PF) injection 100 mcg  100 mcg IntraVENous Multiple        Physical Exam:  There were no vitals taken for this visit.   GENERAL: alert, cooperative, no distress, appears stated age  LUNG: clear to auscultation bilaterally  HEART: regular rate and rhythm  ABD: Non tender, non distended. Alerts:    Hospital Problems  Date Reviewed: 11/9/2017    None          Laboratory:      Recent Labs      08/23/18   0930   HGB  14.8   HCT  41.9   WBC  5.8   PLT  108*         Plan of Care/Planned Procedure:  Risks, benefits, and alternatives reviewed with patient and he agrees to proceed with the procedure. Deemed appropriate or moderate sedation with versed and fentanyl.       Samy Gutiérrez MD

## 2019-11-26 ENCOUNTER — HOSPITAL ENCOUNTER (OUTPATIENT)
Dept: MRI IMAGING | Age: 64
Discharge: HOME | End: 2019-11-26
Attending: OTOLARYNGOLOGY
Payer: MEDICARE

## 2019-11-26 DIAGNOSIS — Z00.8 OTHER SPECIFIED GENERAL MEDICAL EXAMINATION: ICD-10-CM

## 2019-11-26 DIAGNOSIS — H81.09 MENIERE'S DISEASE: ICD-10-CM

## 2019-11-26 DIAGNOSIS — H90.3 SENSORINEURAL HEARING LOSS (SNHL), BILATERAL: ICD-10-CM

## 2019-11-26 PROCEDURE — 74011250636 HC RX REV CODE- 250/636: Performed by: OTOLARYNGOLOGY

## 2019-11-26 PROCEDURE — A9575 INJ GADOTERATE MEGLUMI 0.1ML: HCPCS | Performed by: OTOLARYNGOLOGY

## 2019-11-26 PROCEDURE — 70553 MRI BRAIN STEM W/O & W/DYE: CPT

## 2019-11-26 RX ORDER — GADOTERATE MEGLUMINE 376.9 MG/ML
20 INJECTION INTRAVENOUS
Status: COMPLETED | OUTPATIENT
Start: 2019-11-26 | End: 2019-11-26

## 2019-11-26 RX ADMIN — GADOTERATE MEGLUMINE 20 ML: 376.9 INJECTION INTRAVENOUS at 17:12

## 2020-01-05 ENCOUNTER — HOSPITAL ENCOUNTER (EMERGENCY)
Age: 65
Discharge: HOME | End: 2020-01-05
Attending: EMERGENCY MEDICINE
Payer: MEDICARE

## 2020-01-05 VITALS
OXYGEN SATURATION: 100 % | HEART RATE: 88 BPM | RESPIRATION RATE: 14 BRPM | HEIGHT: 76 IN | BODY MASS INDEX: 33.42 KG/M2 | WEIGHT: 274.47 LBS | SYSTOLIC BLOOD PRESSURE: 143 MMHG | DIASTOLIC BLOOD PRESSURE: 90 MMHG | TEMPERATURE: 97.9 F

## 2020-01-05 DIAGNOSIS — Z94.4 HISTORY OF LIVER TRANSPLANT (HCC): ICD-10-CM

## 2020-01-05 DIAGNOSIS — K04.7 DENTAL ABSCESS: Primary | ICD-10-CM

## 2020-01-05 DIAGNOSIS — K05.219 ABSCESS OF UPPER GUM: ICD-10-CM

## 2020-01-05 PROCEDURE — 99282 EMERGENCY DEPT VISIT SF MDM: CPT

## 2020-01-05 PROCEDURE — 74011000250 HC RX REV CODE- 250: Performed by: PHYSICIAN ASSISTANT

## 2020-01-05 PROCEDURE — 75810000289 HC I&D ABSCESS SIMP/COMP/MULT

## 2020-01-05 RX ORDER — PENICILLIN V POTASSIUM 500 MG/1
500 TABLET, FILM COATED ORAL 4 TIMES DAILY
Qty: 40 TAB | Refills: 0 | Status: SHIPPED | OUTPATIENT
Start: 2020-01-05 | End: 2020-01-15

## 2020-01-05 RX ORDER — OXYCODONE AND ACETAMINOPHEN 7.5; 325 MG/1; MG/1
1 TABLET ORAL
Qty: 15 TAB | Refills: 0 | Status: SHIPPED | OUTPATIENT
Start: 2020-01-05 | End: 2020-01-10

## 2020-01-05 RX ORDER — LIDOCAINE HYDROCHLORIDE AND EPINEPHRINE 10; 10 MG/ML; UG/ML
5 INJECTION, SOLUTION INFILTRATION; PERINEURAL
Status: COMPLETED | OUTPATIENT
Start: 2020-01-05 | End: 2020-01-05

## 2020-01-05 RX ORDER — BUPIVACAINE HYDROCHLORIDE 5 MG/ML
5 INJECTION, SOLUTION EPIDURAL; INTRACAUDAL
Status: COMPLETED | OUTPATIENT
Start: 2020-01-05 | End: 2020-01-05

## 2020-01-05 RX ADMIN — LIDOCAINE HYDROCHLORIDE,EPINEPHRINE BITARTRATE 50 MG: 10; .01 INJECTION, SOLUTION INFILTRATION; PERINEURAL at 13:36

## 2020-01-05 RX ADMIN — BUPIVACAINE HYDROCHLORIDE 25 MG: 5 INJECTION, SOLUTION EPIDURAL; INTRACAUDAL at 13:30

## 2020-01-05 RX ADMIN — BENZOCAINE, BUTAMBEN, AND TETRACAINE HYDROCHLORIDE 1 SPRAY: .028; .004; .004 AEROSOL, SPRAY TOPICAL at 13:30

## 2020-01-05 NOTE — ED PROVIDER NOTES
EMERGENCY DEPARTMENT HISTORY AND PHYSICAL EXAM      Date: 1/5/2020  Patient Name: Mercedes Ortiz    Please note that this dictation was completed with [employer], the computer voice recognition software. Quite often unanticipated grammatical, syntax, homophones, and other interpretive errors are inadvertently transcribed by the computer software. Please disregard these errors. Please excuse any errors that have escaped final proofreading. History of Presenting Illness     Chief Complaint   Patient presents with    Facial Swelling     Ambulatory into the ED with c/o Rt sided facial swelling x several days, worse x last night. Reports pain inside his mouth. History Provided By: Patient    HPI: Mercedes Ortiz, 71 y.o. male with PMHx significant for depression, and liver transplant secondary to cirrhosis (currently taking mycophenolate), presents ambulatory with wife to the ED with cc of right upper dental pain with facial swelling x 4 days. He had pain in the same area 2 months ago and went to his dentist.  He had x-rays which did not show any cavity. The dentist told him to let them know if the pain returned. Patient states that he has pain right along the gumline. Patient states that he is experience mild right-sided facial swelling for the last day associated with this. He denies any skin color changes, sore throat, difficulty swallowing, fever, chills. PCP: Princella Schirmer, MD    There are no other complaints, changes, or physical findings at this time. Current Outpatient Medications   Medication Sig Dispense Refill    penicillin v potassium (VEETID) 500 mg tablet Take 1 Tab by mouth four (4) times daily for 10 days. 40 Tab 0    oxyCODONE-acetaminophen (PERCOCET) 7.5-325 mg per tablet Take 1 Tab by mouth every eight (8) hours as needed for Pain for up to 5 days. Max Daily Amount: 3 Tabs.  Indications: pain 15 Tab 0    warfarin (COUMADIN) 2 mg tablet 3 mg (1.5 tabs) on Mon Weds Fri  2 mg (1 Tab) on the other days 90 Tab 1    cyclobenzaprine (FLEXERIL) 5 mg tablet Take 1 Tab by mouth three (3) times daily as needed for Muscle Spasm(s). 60 Tab 0    BOOSTRIX TDAP 2.5-8-5 Lf-mcg-Lf/0.5mL susp susp       triamcinolone (NASACORT AQ) 55 mcg nasal inhaler 2 Sprays daily.  acetaminophen (TYLENOL) 500 mg tablet Take  by mouth every six (6) hours as needed for Pain. Past History     Past Medical History:  Past Medical History:   Diagnosis Date    Depression     Headache     Sarcoid     Full recovery       Past Surgical History:  Past Surgical History:   Procedure Laterality Date    HX ACL RECONSTRUCTION  0    HX CHOLECYSTECTOMY  2006    HX COLONOSCOPY  1/1/2013    HX TONSIL AND ADENOIDECTOMY         Family History:  Family History   Problem Relation Age of Onset    Diabetes Mother     Stroke Mother     Cancer Father        Social History:  Social History     Tobacco Use    Smoking status: Former Smoker     Packs/day: 0.25     Years: 15.00     Pack years: 3.75     Types: Cigars     Last attempt to quit: 12/12/2016     Years since quitting: 3.0    Smokeless tobacco: Never Used   Substance Use Topics    Alcohol use: No     Alcohol/week: 0.0 standard drinks    Drug use: No       Allergies:  No Known Allergies      Review of Systems   Review of Systems   Constitutional: Negative. Negative for chills and fever. HENT: Positive for dental problem and facial swelling. Eyes: Negative for pain and visual disturbance. Respiratory: Negative for shortness of breath. Cardiovascular: Negative for chest pain and palpitations. Gastrointestinal: Negative for nausea and vomiting. Musculoskeletal: Negative for arthralgias and myalgias. Skin: Negative for color change, rash and wound. Neurological: Negative for numbness and headaches. All other systems reviewed and are negative. Physical Exam   Physical Exam  Vitals signs and nursing note reviewed.    Constitutional: General: He is not in acute distress. Appearance: He is well-developed. He is not diaphoretic. Comments: 71 y.o. male in NAD  Communicates appropriately and in full sentences  Normal vital signs   HENT:      Head: Normocephalic and atraumatic. Comments: Very mild right-sided upper facial swelling to the maxillary area. Uvula is midline. No evidence of RPA or PTA. Tolerating own secretions without trismus or stridor. Small fluctuant abscess to the upper gumline just above the canine tooth. No active drainage. Nose: Nose normal.      Mouth/Throat:      Mouth: Mucous membranes are moist.   Eyes:      General:         Right eye: No discharge. Left eye: No discharge. Conjunctiva/sclera: Conjunctivae normal.      Pupils: Pupils are equal, round, and reactive to light. Neck:      Musculoskeletal: Normal range of motion and neck supple. Comments: No nuchal rigidity  Cardiovascular:      Rate and Rhythm: Normal rate and regular rhythm. Pulmonary:      Effort: Pulmonary effort is normal. No respiratory distress. Breath sounds: Normal breath sounds. No wheezing. Abdominal:      General: There is no distension. Palpations: Abdomen is soft. Tenderness: There is no tenderness. Musculoskeletal: Normal range of motion. General: No tenderness or deformity. Comments: No neurologic or vascular compromise on exam.    Skin:     General: Skin is warm and dry. Coloration: Skin is not pale. Findings: No erythema or rash. Neurological:      Mental Status: He is alert and oriented to person, place, and time. Coordination: Coordination normal.           Diagnostic Study Results     No formal testing initiated. Medical Decision Making   I am the first provider for this patient. I reviewed the vital signs, available nursing notes, past medical history, past surgical history, family history and social history.     Vital Signs-Reviewed the patient's vital signs. Patient Vitals for the past 12 hrs:   Temp Pulse Resp BP SpO2   01/05/20 1240 97.9 °F (36.6 °C) 88 14 143/90 100 %         Records Reviewed: Nursing Notes and Old Medical Records    Provider Notes (Medical Decision Making):   DDx: sialadenitis, dental caries, fractured tooth, poor compliance with regular dental follow-up, lack of dental insurance, trigeminal neuralgia, retropharyngeal abscess, M[place]. Patient presents with dental pain with mild facial swelling. Abscess along the gumline to the R canine tooth. No red flags that make PTA, RPA, ludwigs angina concerning. Pt's vital signs have been stable while in the ED [facility] ED. Will tx with dental block followed by I&D of abscess. Upon discharge, antibiotics and outpatient analgesics will be prescribed. Pt states that he can take tylenol safely, per his hepatologist. Pt has stable follow-up with dentist.     ED Course:   Initial assessment performed. The patients presenting problems have been discussed, and they are in agreement with the care plan formulated and outlined with them. I have encouraged them to ask questions as they arise throughout their visit. Procedure Note - Dental Block:    1:50 PM  Performed by Shamika Taylor PA-C . A inferior alveolar nerve block and supraperiosteal infiltration was performed on the right side. 2 mL of 0.5% bupivicaine, 2% lidocaine and with epinephrine was injected. Total time at bedside, performing this procedure was 1-15 minutes. The procedure was tolerated well without any complications. Procedure Note - Incision and Drainage:   1:59 PM  Performed by: Shamika Taylor PA-C   Complexity: simple  Skin prepped with saline. Sterile field established. Anesthesia achieved with previous dental block. Abscess to upper gumline was incised with 18g needle and 3mLs of thick, purulent, malodorous drainage was expressed. Sterile dressing applied.     Estimated blood loss: Scant  The procedure took 1-15 minutes, and pt tolerated well. DISCHARGE NOTE:  Agueda Pena  results have been reviewed with him. He has been counseled regarding his diagnosis. He verbally conveys understanding and agreement of the signs, symptoms, diagnosis, treatment and prognosis and additionally agrees to follow up as recommended with Dr. Ede Garcia MD in 24 - 48 hours. He also agrees with the care-plan and conveys that all of his questions have been answered. I have also put together some discharge instructions for him that include: 1) educational information regarding their diagnosis, 2) how to care for their diagnosis at home, as well a 3) list of reasons why they would want to return to the ED prior to their follow-up appointment, should their condition change. He and/or family's questions have been answered. I have encouraged them to see the official results in [facility]\" or to retrieve the specifics of their results from medical records. PLAN:  1. Return precautions as discussed  2. Follow-up with providers as directed  3. Medications as prescribed    Return to ED if worse     Diagnosis     Clinical Impression:   1. Dental abscess    2. History of liver transplant ([facility] [address]    3. Abscess of upper gum        Current Discharge Medication List      START taking these medications    Details   penicillin v potassium (VEETID) 500 mg tablet Take 1 Tab by mouth four (4) times daily for 10 days. Qty: 40 Tab, Refills: 0      oxyCODONE-acetaminophen (PERCOCET) 7.5-325 mg per tablet Take 1 Tab by mouth every eight (8) hours as needed for Pain for up to 5 days. Max Daily Amount: 3 Tabs. Indications: pain  Qty: 15 Tab, Refills: 0    Associated Diagnoses: Dental abscess;  Abscess of upper gum             Follow-up Information     Follow up With Specialties Details Why Contact Info    Ede Garcia MD Family Practice, Family Practice Schedule an appointment as soon as possible for a visit in 2 days As needed, If symptoms worsen [address]  [address] [phone]      Your Dentist  Call today To schedule an appointment as soon as possible. This note will not be viewable in [address]

## 2020-01-05 NOTE — DISCHARGE INSTRUCTIONS
Patient Education        Periodontal Conditions: Care Instructions  Your Care Instructions    Periodontal conditions affect the gums, bone, and tissue that surround and support the teeth. The most common problems are caused by plaque. Plaque is a thin film of bacteria that sticks to teeth above and below the gum line. It can build up and harden into tartar. The bacteria in plaque and tartar can cause gum disease. Gingivitis is a disease that affects the gums (gingiva). The gums are the soft tissue that surrounds the teeth. Gingivitis causes red, swollen, tender gums that bleed easily when brushed, persistent bad breath, and sensitive teeth. Because it is not painful, many people do not get treatment when they should. Gingivitis can be reversed with good dental care. Periodontitis is a more advanced disease that affects more than the gums. The gums pull away from the teeth. This leaves deep pockets where bacteria can grow. The disease can damage the bones that support the teeth. The teeth may get loose and fall out. A periodontal condition should be treated as soon as it is found. Finding gum problems early, treating them right away, and having regular checkups bring the best results. You can treat mild periodontal conditions by brushing and flossing your teeth every day. Your dentist may prescribe a mouthwash to kill the bacteria that can damage teeth and gums. Your dentist may have you take antibiotics to treat infection from moderate periodontal disease. If your gums have pulled away from your teeth, you may need cleaning between the teeth and gums right down to the teeth roots. This is called root planing and scaling. If you have severe periodontal disease, you may need surgery to remove diseased gum tissue or repair bone damage. Follow-up care is a key part of your treatment and safety. Be sure to make and go to all appointments, and call your dentist if you are having problems.  It's also a good idea to know your test results and keep a list of the medicines you take. How can you care for yourself at home? · If your dentist prescribed antibiotics, take them as directed. Do not stop taking them just because you feel better. You need to take the full course of antibiotics. · Brush your teeth twice a day, in the morning and at night. ? Use a toothbrush with soft, rounded-end bristles and a head that is small enough to reach all parts of your teeth and mouth. Replace your toothbrush every 3 to 4 months. ? Use a fluoride toothpaste. ? Place the brush at a 45-degree angle where the teeth meet the gums. Press firmly, and gently rock the brush back and forth using small circular movements. ? Brush chewing surfaces vigorously with short back-and-forth strokes. ? Brush your tongue from back to front. · Floss at least once a day. Choose the type and flavor that you like best.  · Have your teeth cleaned by a professional at least twice a year. · Ask your dentist about using an antibacterial mouthwash to help reduce bacteria. · Rinse your mouth with water or chew sugar-free gum after meals if you can't brush your teeth. · Do not smoke or use smokeless tobacco. Tobacco use can cause periodontal disease. When should you call for help? Call your dentist now or seek immediate medical care if:    · You have symptoms of infection, such as:  ? Increased pain, swelling, warmth, or redness. ? Red streaks leading from the area. ? Pus draining from the area. ? A fever.    Watch closely for changes in your health, and be sure to contact your dentist if:    · You have new or worse tooth pain.     · You do not get better as expected. Where can you learn more? Go to http://lon-mely.info/. Enter J807 in the search box to learn more about \"Periodontal Conditions: Care Instructions. \"  Current as of: October 3, 2018  Content Version: 12.2  © 4468-0076 [employer].  Care instructions adapted under license by [employer] (which disclaims liability or warranty for this information). If you have questions about a medical condition or this instruction, always ask your healthcare professional. [address] any warranty or liability for your use of this information. Patient Education        Abscessed Tooth: Care Instructions  Your Care Instructions    An abscessed tooth is a tooth that has a pocket of pus in the tissues around it. Pus forms when the body tries to fight an infection caused by bacteria. If the pus cannot drain, it forms an abscess. An abscessed tooth can cause red, swollen gums and throbbing pain, especially when you chew. You may have a bad taste in your mouth and a fever, and your jaw may swell. Damage to the tooth, untreated tooth decay, or gum disease can cause an abscessed tooth. An abscessed tooth needs to be treated by a dental professional right away. If it is not treated, the infection could spread to other parts of your body. Your dentist will give you antibiotics to stop the infection. He or she may make a hole in the tooth or cut open (brett) the abscess inside your mouth so that the infection can drain, which should relieve your pain. You may need to have a root canal treatment, which tries to save your tooth by taking out the infected pulp and replacing it with a healing medicine and/or a filling. If these treatments do not work, your tooth may have to be removed. Follow-up care is a key part of your treatment and safety. Be sure to make and go to all appointments, and call your doctor if you are having problems. It's also a good idea to know your test results and keep a list of the medicines you take. How can you care for yourself at home? · Reduce pain and swelling in your face and jaw by putting ice or a cold pack on the outside of your cheek for 10 to 20 minutes at a time. Put a thin cloth between the ice and your skin.   · Take pain medicines exactly as directed. ? If the doctor gave you a prescription medicine for pain, take it as prescribed. ? If you are not taking a prescription pain medicine, ask your doctor if you can take an over-the-counter medicine. · Take your antibiotics as directed. Do not stop taking them just because you feel better. You need to take the full course of antibiotics. To prevent tooth abscess  · Brush and floss every day, and have regular dental checkups. · Eat a healthy diet, and avoid sugary foods and drinks. · Do not smoke, use e-cigarettes with nicotine, or use spit tobacco. Tobacco and nicotine slow your ability to heal. Tobacco also increases your risk for gum disease and cancer of the mouth and throat. If you need help quitting, talk to your doctor about stop-smoking programs and medicines. These can increase your chances of quitting for good. When should you call for help? Call 911 anytime you think you may need emergency care. For example, call if:    · You have trouble breathing.    Call your doctor now or seek immediate medical care if:    · You have new or worse symptoms of infection, such as:  ? Increased pain, swelling, warmth, or redness. ? Red streaks leading from the area. ? Pus draining from the area. ? A fever.    Watch closely for changes in your health, and be sure to contact your doctor if:    · You do not get better as expected. Where can you learn more? Go to http://lon-mely.info/. Enter M334 in the search box to learn more about \"Abscessed Tooth: Care Instructions. \"  Current as of: October 3, 2018  Content Version: 12.2  © 5671-2348 [employer]. Care instructions adapted under license by [employer] (which disclaims liability or warranty for this information).  If you have questions about a medical condition or this instruction, always ask your healthcare professional. [place] [address] any warranty or liability for your use of this information.

## 2020-09-18 ENCOUNTER — OFFICE VISIT (OUTPATIENT)
Dept: NEUROLOGY | Age: 65
End: 2020-09-18
Payer: MEDICARE

## 2020-09-18 VITALS
BODY MASS INDEX: 34.1 KG/M2 | HEART RATE: 68 BPM | HEIGHT: 76 IN | OXYGEN SATURATION: 97 % | WEIGHT: 280 LBS | SYSTOLIC BLOOD PRESSURE: 148 MMHG | DIASTOLIC BLOOD PRESSURE: 84 MMHG | RESPIRATION RATE: 16 BRPM

## 2020-09-18 DIAGNOSIS — H81.02 MENIERE'S DISEASE OF LEFT EAR: Primary | ICD-10-CM

## 2020-09-18 DIAGNOSIS — E53.8 B12 DEFICIENCY: ICD-10-CM

## 2020-09-18 DIAGNOSIS — R42 VERTIGO: ICD-10-CM

## 2020-09-18 DIAGNOSIS — I65.23 BILATERAL CAROTID ARTERY STENOSIS: ICD-10-CM

## 2020-09-18 DIAGNOSIS — G44.86 CERVICOGENIC HEADACHE: ICD-10-CM

## 2020-09-18 DIAGNOSIS — G45.0 VERTEBROBASILAR ARTERY INSUFFICIENCY: ICD-10-CM

## 2020-09-18 DIAGNOSIS — E03.4 HYPOTHYROIDISM DUE TO ACQUIRED ATROPHY OF THYROID: ICD-10-CM

## 2020-09-18 DIAGNOSIS — R42 CERVICAL VERTIGO: ICD-10-CM

## 2020-09-18 PROCEDURE — 3017F COLORECTAL CA SCREEN DOC REV: CPT | Performed by: PSYCHIATRY & NEUROLOGY

## 2020-09-18 PROCEDURE — G8417 CALC BMI ABV UP PARAM F/U: HCPCS | Performed by: PSYCHIATRY & NEUROLOGY

## 2020-09-18 PROCEDURE — G8427 DOCREV CUR MEDS BY ELIG CLIN: HCPCS | Performed by: PSYCHIATRY & NEUROLOGY

## 2020-09-18 PROCEDURE — 99205 OFFICE O/P NEW HI 60 MIN: CPT | Performed by: PSYCHIATRY & NEUROLOGY

## 2020-09-18 PROCEDURE — G8536 NO DOC ELDER MAL SCRN: HCPCS | Performed by: PSYCHIATRY & NEUROLOGY

## 2020-09-18 PROCEDURE — G9717 DOC PT DX DEP/BP F/U NT REQ: HCPCS | Performed by: PSYCHIATRY & NEUROLOGY

## 2020-09-18 PROCEDURE — 1101F PT FALLS ASSESS-DOCD LE1/YR: CPT | Performed by: PSYCHIATRY & NEUROLOGY

## 2020-09-18 RX ORDER — MECLIZINE HCL 12.5 MG 12.5 MG/1
25 TABLET ORAL AS NEEDED
COMMUNITY
Start: 2019-12-09

## 2020-09-18 RX ORDER — MYCOPHENOLATE MOFETIL 250 MG/1
500 CAPSULE ORAL 2 TIMES DAILY
COMMUNITY
Start: 2020-01-21

## 2020-09-18 RX ORDER — TACROLIMUS 1 MG/1
1 CAPSULE ORAL 2 TIMES DAILY
COMMUNITY
Start: 2020-01-21

## 2020-09-18 RX ORDER — BACLOFEN 20 MG
500 TABLET ORAL DAILY
COMMUNITY

## 2020-09-18 RX ORDER — PREDNISONE 5 MG/1
5 TABLET ORAL DAILY
COMMUNITY
Start: 2020-01-21 | End: 2022-03-15 | Stop reason: ALTCHOICE

## 2020-09-18 NOTE — LETTER
9/18/20 Patient: Ellyn Wilson YOB: 1955 Date of Visit: 9/18/2020 Silviano Zazueta MD 
[facility] [id number] VIA Facsimile: [phone] Dear Silviano Zazueta MD, Thank you for referring Mr. Radha Garcia to [address] [place] [address] for evaluation. My notes for this consultation are attached. Consult REFERRED BY: 
Bob Parisi MD 
 
CHIEF COMPLAINT: Dizziness and vertigo and headaches and neck pain Subjective:  
 
Ellyn Wilson is a 71 y.o. right-handed  male seen as a new patient at the request of Dr. Lis Williamson for evaluation of new problem of recent worsening of his vertigo and neck pain and headaches that started the base of the skull and radiate up to the frontal region, associated with increased dizziness and vertigo over the last several months, but the patient over the last 2 to 3 weeks has had no vertigo at all. He had onset of vertigo in 2007 when he was diagnosed with Ménière's disease because of severe pressure sensation in his ear, hearing loss in his left ear, and severe intermittent and episodic vertigo. He has seen 3 ENT physicians at least in the past, and has tried steroid injections in his ears several times in the past, most recently I think in March 2020. He had an MRI of the internal auditory canals November 2019 that was completely normal.  The brain itself was also normal.  The patient is never had any focal weakness or sensory loss, any vision complaints, other than occasional slight blurred vision when the headaches and the vertigo are bad, but never any double vision. The patient has been found to have arthritis in his neck, and is seeing a chiropractor who is helping him a lot with the neck pain and muscle spasms and cervicogenic headaches and his cervicogenic vertigo. He was sent here to make sure there was nothing neurologically wrong.   His neurologic examination appears to be essentially normal except for the hearing loss in his left ear. Patient has a complicated history of idiopathic cirrhosis and then developed liver cancer and have a liver replacement and transplant and is on chronic antirejection therapy. He also has a history of sarcoidosis. He had a history of DVT in the past also. He is on chronic Coumadin therapy. He has had gallbladder surgery in the past and a pulmonary embolism in the past.  He has seen an orthopedist for his neck pain and they also agreed he has degenerative arthritis but no surgical lesions. Uses Antivert as needed for the vertigo when he does have spells. He again is seen a chiropractor who has helped his neck pain significantly. He is never had a stroke or TIA or any discrete neck again cerebrovascular disease. He denies any fever, meningismus, altered mental status, and states that with his vertigo he does have a severe left ear pain and pressure and ringing in his ear, and has nausea and vomiting and has difficulty walking because he tends to fall in all directions. Past Medical History:  
Diagnosis Date  Cancer ([facility] [address] Liver cancer  Depression  Headache  Sarcoid Full recovery Past Surgical History:  
Procedure Laterality Date [address]  HX CHOLECYSTECTOMY  2006  HX COLONOSCOPY  1/1/2013  HX TONSIL AND ADENOIDECTOMY Family History Problem Relation Age of Onset  Diabetes Mother  Stroke Mother  Dementia Mother  Migraines Mother  Heart Disease Mother  Neuropathy Mother  Cancer Father   
     lung  COPD Father Social History Tobacco Use  Smoking status: Former Smoker Packs/day: 0.25 Years: 15.00 Pack years: 3.75 Types: Cigars Last attempt to quit: 12/12/2016 Years since quitting: 3.7  Smokeless tobacco: Never Used  Tobacco comment: Still smokes Cigars Substance Use Topics  Alcohol use:  No  
 Alcohol/week: 0.0 standard drinks Current Outpatient Medications:  
  mycophenolate mofetil (CELLCEPT) 250 mg capsule, Take 1,000 mg by mouth daily. , Disp: , Rfl:  
  tacrolimus (PROGRAF) 1 mg capsule, Take 1 mg by mouth three (3) times daily. , Disp: , Rfl:  
  predniSONE (DELTASONE) 5 mg tablet, Take 5 mg by mouth daily. , Disp: , Rfl:  
  meclizine (ANTIVERT) 12.5 mg tablet, Take 25 mg by mouth as needed. , Disp: , Rfl:  
  magnesium oxide 500 mg tab, Take 500 mg by mouth daily. , Disp: , Rfl:  
  ubrogepant (Ubrelvy) 50 mg tablet, 1 PO att onset of headache and can repeat in 2 hours if needed, max dose 2 per day, Disp: 10 Tab, Rfl: 5 
  warfarin (COUMADIN) 2 mg tablet, 3 mg (1.5 tabs) on Mon Weds Fri  2 mg (1 Tab) on the other days, Disp: 90 Tab, Rfl: 1 
  triamcinolone (NASACORT AQ) 55 mcg nasal inhaler, 2 Sprays daily. , Disp: , Rfl:  
  acetaminophen (TYLENOL) 500 mg tablet, Take  by mouth every six (6) hours as needed for Pain., Disp: , Rfl:  
 
 
 
No Known Allergies MRI Results (most recent): 
Results from Hospital Encounter encounter on 11/26/19 MRI IAC W WO CONT Narrative Clinical indication: Bilateral sensorineural hearing loss. Meniere's disease. Technical factors: Diffusion imaging, sagittal T1-weighted, axial T1-weighted 
pre and post 20 cc IV Dotarem T2-weighted FLAIR gradient echo axial gradient 
echo temporal bone axial and coronal T1-weighted pre and postcontrast temporal 
bones. No prior. Diffusion imaging does not show significant abnormalities. There is no extra-axial fluid collection hemorrhage or shift. Ventricular size 
is normal for age. There is no significant white matter disease. Flow voids in major vessels at the base of the brain are present. There is no enhancing intracranial lesion or masses her. Special attention to the temporal bones. Does not show any fluid collection 
enhancing lesion or masses. Impression IMPRESSION: Negative examination. Results from Hospital Encounter encounter on 11/26/19 MRI IAC W WO CONT Narrative Clinical indication: Bilateral sensorineural hearing loss. Meniere's disease. Technical factors: Diffusion imaging, sagittal T1-weighted, axial T1-weighted 
pre and post 20 cc IV Dotarem T2-weighted FLAIR gradient echo axial gradient 
echo temporal bone axial and coronal T1-weighted pre and postcontrast temporal 
bones. No prior. Diffusion imaging does not show significant abnormalities. There is no extra-axial fluid collection hemorrhage or shift. Ventricular size 
is normal for age. There is no significant white matter disease. Flow voids in major vessels at the base of the brain are present. There is no enhancing intracranial lesion or masses her. Special attention to the temporal bones. Does not show any fluid collection 
enhancing lesion or masses. Impression IMPRESSION: Negative examination. Review of Systems: A comprehensive review of systems was negative except for: Constitutional: positive for fatigue and malaise Eyes: positive for visual disturbance Ears, nose, mouth, throat, and face: positive for hearing loss, tinnitus and earaches Musculoskeletal: positive for myalgias, arthralgias, stiff joints and neck pain Neurological: positive for headaches, dizziness and vertigo Behvioral/Psych: positive for anxiety Vitals:  
 09/18/20 4261 BP: (!) 148/84 Pulse: 68 Resp: 16 SpO2: 97% Weight: 280 lb (127 kg) Height: 6' 4\" (1.93 m) Objective: I 
 
 
NEUROLOGICAL EXAM: 
 
Appearance: The patient is well developed, well nourished, provides a coherent history and is in no acute distress. Mental Status: Oriented to time, place and person, and the president, cognitive function is normal and speech is fluent and no aphasia or dysarthria. Mood and affect appropriate. Cranial Nerves:   Intact visual fields.  Fundi are benign, disc are flat, no lesions seen on funduscopy. VALENTINA, EOM's full, no nystagmus, no ptosis. Facial sensation is normal. Corneal reflexes are not tested. Facial movement is symmetric. Hearing is normal on the right and markedly decreased on the left. Tympanic membranes look normal bilaterally. Palate is midline with normal sternocleidomastoid and trapezius muscles are normal. Tongue is midline. Neck without meningismus or bruits and patient has mild restriction of his range of motion tightness in his neck muscles that is palpable. Temporal arteries are not tender or enlarged TMJ areas are not tender on palpation Motor:  5/5 strength in upper and lower proximal and distal muscles. Normal bulk and tone. No fasciculations. Rapid alternating movement is symmetric and intact bilaterally Reflexes:   Deep tendon reflexes 2+/4 and symmetrical. 
No babinski or clonus present Sensory:   Normal to touch, pinprick and vibration and temperature. DSS is intact Gait:  Normal gait for patient's age. Tremor:   No tremor noted. Cerebellar:  No abnormal cerebellar signs present on Romberg and tandem testing and finger-nose-finger exam.  
Neurovascular:  Normal heart sounds and regular rhythm, peripheral pulses decreased, and no carotid bruits. Assessment: ICD-10-CM ICD-9-CM 1. Meniere's disease of left ear  H81.02 386.00 MRA BRAIN WO CONT  
   DUPLEX CAROTID BILATERAL  
   VITAMIN B12 & FOLATE  
   TSH 3RD GENERATION  
   ubrogepant (Ubrelvy) 50 mg tablet 2. Vertigo  R42 780.4 MRA BRAIN WO CONT  
   DUPLEX CAROTID BILATERAL  
   VITAMIN B12 & FOLATE  
   TSH 3RD GENERATION  
   ubrogepant (Ubrelvy) 50 mg tablet 3. Bilateral carotid artery stenosis  I65.23 433.10 MRA BRAIN WO CONT  
  433.30 DUPLEX CAROTID BILATERAL  
   VITAMIN B12 & FOLATE  
   TSH 3RD GENERATION  
   ubrogepant (Ubrelvy) 50 mg tablet 4.  Vertebrobasilar artery insufficiency  G45.0 435.3 MRA BRAIN WO CONT  
   DUPLEX CAROTID BILATERAL  
 VITAMIN B12 & FOLATE  
   TSH 3RD GENERATION  
   ubrogepant (Ubrelvy) 50 mg tablet 5. Cervical vertigo  R42 780.4 MRA BRAIN WO CONT  
   DUPLEX CAROTID BILATERAL  
   VITAMIN B12 & FOLATE  
   TSH 3RD GENERATION  
   ubrogepant (Ubrelvy) 50 mg tablet 6. B12 deficiency  E53.8 266.2 MRA BRAIN WO CONT  
   DUPLEX CAROTID BILATERAL  
   VITAMIN B12 & FOLATE  
   TSH 3RD GENERATION  
   ubrogepant (Ubrelvy) 50 mg tablet 7. Hypothyroidism due to acquired atrophy of thyroid  E03.4 244.8 MRA BRAIN WO CONT  
  246.8 DUPLEX CAROTID BILATERAL  
   VITAMIN B12 & FOLATE  
   TSH 3RD GENERATION  
   ubrogepant (Ubrelvy) 50 mg tablet 8. Cervicogenic headache  R51 784.0 MRA BRAIN WO CONT  
   DUPLEX CAROTID BILATERAL  
   VITAMIN B12 & FOLATE  
   TSH 3RD GENERATION  
   ubrogepant (Ubrelvy) 50 mg tablet Active Problems: * No active hospital problems. * 
 
 
Plan:  
 
Patient with history of Ménière's disease, and his symptoms sound classic for that disease, and he has normal MRI of the brain and of the internal auditory canals done November 2019 and a normal exam neurologically essentially today, except for the hearing loss in the left ear I advised the patient we could check an MRI of the brain making sure there is no vascular lesions in the posterior fossa that could account for his symptoms, will also check a carotid Doppler just to make sure there is no obstructive cerebrovascular disease or could be causing dizziness or vertigo, but I doubt these are the cause of his symptoms. He is already getting therapy for his arthritis in his neck which is causing cervicogenic vertigo and cervicogenic headaches and advised him that the reason his dizziness is worse with the neck pain is from cervicogenic vertigo and his headaches also get worse when he has a lot of neck pain from cervicogenic headaches I doubt we will find anything structurally wrong with his vessels in his brain. Since he is doing so much better for the last 3 weeks we advised him to continue his current therapy and work closely with his ENT for his Ménière's disease He is going to continue his Antivert and as needed diuretics for his vertigo. We did check a B12 and thyroid test just to make sure there is no other cause for his vertigo. We will follow-up in 6 months time, he will check my chart for results of his tests or call us afterwards. For his headaches since he cannot take Tylenol compounds because of his liver problems, we will try him on you really if he can get insurance coverage, and Fioricet seems to interact with his immunosuppressive drugs. At his age he is not a good candidate for triptans. Treating his headaches will be difficult. . 
Signed By: Guillermo Fagan MD   
 September 18, 2020 CC: Steven Carlton MD 
FAX: [phone] This note will not be viewable in [address] If you have questions, please do not hesitate to call me. I look forward to following your patient along with you. Sincerely, Guillermo Fagan MD

## 2020-09-18 NOTE — PATIENT INSTRUCTIONS
Office Policies    o Phone calls/patient messages:  Please allow up to 24 hours for someone in the office to contact you about your call or message. Be mindful your provider may be out of the office or your message may require further review. We encourage you to use [employer] for your messages as this is a faster, more efficient way to communicate with our office    o Medication Refills:  Prescription medications require up to 48 business hours to process. We encourage you to use [employer] for your refills. For controlled medications: Please allow up to 72 business hours to process. Certain medications may require you to  a written prescription at our office. NO narcotic/controlled medications will be prescribed after 4pm Monday through Friday or on weekends    o Form/Paperwork Completion:  We ask that you allow 7-14 business days. You may also download your forms to [employer] to have your doctor print off.

## 2020-09-19 NOTE — PROGRESS NOTES
Consult  REFERRED BY:  Angel Hdez MD    CHIEF COMPLAINT: Dizziness and vertigo and headaches and neck pain      Subjective:     Shahnaz Chapin is a 71 y.o. right-handed  male seen as a new patient at the request of Dr. Karla Adamson for evaluation of new problem of recent worsening of his vertigo and neck pain and headaches that started the base of the skull and radiate up to the frontal region, associated with increased dizziness and vertigo over the last several months, but the patient over the last 2 to 3 weeks has had no vertigo at all. He had onset of vertigo in 2007 when he was diagnosed with Ménière's disease because of severe pressure sensation in his ear, hearing loss in his left ear, and severe intermittent and episodic vertigo. He has seen 3 ENT physicians at least in the past, and has tried steroid injections in his ears several times in the past, most recently I think in March 2020. He had an MRI of the internal auditory canals November 2019 that was completely normal.  The brain itself was also normal.  The patient is never had any focal weakness or sensory loss, any vision complaints, other than occasional slight blurred vision when the headaches and the vertigo are bad, but never any double vision. The patient has been found to have arthritis in his neck, and is seeing a chiropractor who is helping him a lot with the neck pain and muscle spasms and cervicogenic headaches and his cervicogenic vertigo. He was sent here to make sure there was nothing neurologically wrong. His neurologic examination appears to be essentially normal except for the hearing loss in his left ear. Patient has a complicated history of idiopathic cirrhosis and then developed liver cancer and have a liver replacement and transplant and is on chronic antirejection therapy. He also has a history of sarcoidosis. He had a history of DVT in the past also. He is on chronic Coumadin therapy.   He has had gallbladder surgery in the past and a pulmonary embolism in the past.  He has seen an orthopedist for his neck pain and they also agreed he has degenerative arthritis but no surgical lesions. Uses Antivert as needed for the vertigo when he does have spells. He again is seen a chiropractor who has helped his neck pain significantly. He is never had a stroke or TIA or any discrete neck again cerebrovascular disease. He denies any fever, meningismus, altered mental status, and states that with his vertigo he does have a severe left ear pain and pressure and ringing in his ear, and has nausea and vomiting and has difficulty walking because he tends to fall in all directions. Past Medical History:   Diagnosis Date    Cancer [facility])     Liver cancer    Depression     Headache     Sarcoid     Full recovery      Past Surgical History:   Procedure Laterality Date    HX ACL RECONSTRUCTION  1997    HX CHOLECYSTECTOMY  2006    HX COLONOSCOPY  1/1/2013    HX TONSIL AND ADENOIDECTOMY       Family History   Problem Relation Age of Onset    Diabetes Mother     Stroke Mother     Dementia Mother    [facility] Mother     Heart Disease Mother     Neuropathy Mother     Cancer Father         lung    COPD Father       Social History     Tobacco Use    Smoking status: Former Smoker     Packs/day: 0.25     Years: 15.00     Pack years: 3.75     Types: Cigars     Last attempt to quit: 12/12/2016     Years since quitting: 3.7    Smokeless tobacco: Never Used    Tobacco comment: Still smokes Cigars   Substance Use Topics    Alcohol use: No     Alcohol/week: 0.0 standard drinks         Current Outpatient Medications:     mycophenolate mofetil (CELLCEPT) 250 mg capsule, Take 1,000 mg by mouth daily. , Disp: , Rfl:     tacrolimus (PROGRAF) 1 mg capsule, Take 1 mg by mouth three (3) times daily. , Disp: , Rfl:     predniSONE (DELTASONE) 5 mg tablet, Take 5 mg by mouth daily. , Disp: , Rfl:     meclizine (ANTIVERT) 12.5 mg tablet, Take 25 mg by mouth as needed. , Disp: , Rfl:     magnesium oxide 500 mg tab, Take 500 mg by mouth daily. , Disp: , Rfl:     ubrogepant (Ubrelvy) 50 mg tablet, 1 PO att onset of headache and can repeat in 2 hours if needed, max dose 2 per day, Disp: 10 Tab, Rfl: 5    warfarin (COUMADIN) 2 mg tablet, 3 mg (1.5 tabs) on Mon Weds Fri  2 mg (1 Tab) on the other days, Disp: 90 Tab, Rfl: 1    triamcinolone (NASACORT AQ) 55 mcg nasal inhaler, 2 Sprays daily. , Disp: , Rfl:     acetaminophen (TYLENOL) 500 mg tablet, Take  by mouth every six (6) hours as needed for Pain., Disp: , Rfl:         No Known Allergies   MRI Results (most recent):  Results from Hospital Encounter encounter on 11/26/19   MRI IAC W WO CONT    Narrative Clinical indication: Bilateral sensorineural hearing loss. Meniere's disease. Technical factors: Diffusion imaging, sagittal T1-weighted, axial T1-weighted  pre and post 20 cc IV Dotarem T2-weighted FLAIR gradient echo axial gradient  echo temporal bone axial and coronal T1-weighted pre and postcontrast temporal  bones. No prior. Diffusion imaging does not show significant abnormalities. There is no extra-axial fluid collection hemorrhage or shift. Ventricular size  is normal for age. There is no significant white matter disease. Flow voids in major vessels at the base of the brain are present. There is no enhancing intracranial lesion or masses her. Special attention to the temporal bones. Does not show any fluid collection  enhancing lesion or masses. Impression IMPRESSION: Negative examination. Results from [place] encounter on 11/26/19   MRI IAC W WO CONT    Narrative Clinical indication: Bilateral sensorineural hearing loss. Meniere's disease.     Technical factors: Diffusion imaging, sagittal T1-weighted, axial T1-weighted  pre and post 20 cc IV Dotarem T2-weighted FLAIR gradient echo axial gradient  echo temporal bone axial and coronal T1-weighted pre and postcontrast temporal  bones. No prior. Diffusion imaging does not show significant abnormalities. There is no extra-axial fluid collection hemorrhage or shift. Ventricular size  is normal for age. There is no significant white matter disease. Flow voids in major vessels at the base of the brain are present. There is no enhancing intracranial lesion or masses her. Special attention to the temporal bones. Does not show any fluid collection  enhancing lesion or masses. Impression IMPRESSION: Negative examination. Review of Systems:  A comprehensive review of systems was negative except for: Constitutional: positive for fatigue and malaise  Eyes: positive for visual disturbance  Ears, nose, mouth, throat, and face: positive for hearing loss, tinnitus and earaches  Musculoskeletal: positive for myalgias, arthralgias, stiff joints and neck pain  Neurological: positive for headaches, dizziness and vertigo  Behvioral/Psych: positive for anxiety   Vitals:    09/18/20 0852   BP: (!) 148/84   Pulse: 68   Resp: 16   SpO2: 97%   Weight: 280 lb (127 kg)   Height: 6' 4\" (1.93 m)     Objective:     I      NEUROLOGICAL EXAM:    Appearance: The patient is well developed, well nourished, provides a coherent history and is in no acute distress. Mental Status: Oriented to time, place and person, and the president, cognitive function is normal and speech is fluent and no aphasia or dysarthria. Mood and affect appropriate. Cranial Nerves:   Intact visual fields. Fundi are benign, disc are flat, no lesions seen on funduscopy. [place], EOM's full, no nystagmus, no ptosis. Facial sensation is normal. Corneal reflexes are not tested. Facial movement is symmetric. Hearing is normal on the right and markedly decreased on the left. Tympanic membranes look normal bilaterally. Palate is midline with normal sternocleidomastoid and trapezius muscles are normal. Tongue is midline.   Neck without meningismus or bruits and patient has mild restriction of his range of motion tightness in his neck muscles that is palpable. Temporal arteries are not tender or enlarged  TMJ areas are not tender on palpation   Motor:  5/5 strength in upper and lower proximal and distal muscles. Normal bulk and tone. No fasciculations. Rapid alternating movement is symmetric and intact bilaterally   Reflexes:   Deep tendon reflexes 2+/4 and symmetrical.  No babinski or clonus present   Sensory:   Normal to touch, pinprick and vibration and temperature. DSS is intact   Gait:  Normal gait for patient's age. Tremor:   No tremor noted. Cerebellar:  No abnormal cerebellar signs present on Romberg and tandem testing and finger-nose-finger exam.   Neurovascular:  Normal heart sounds and regular rhythm, peripheral pulses decreased, and no carotid bruits. Assessment:       ICD-10-CM ICD-9-CM    1. Meniere's disease of left ear  H81.02 386.00 MRA BRAIN WO CONT      DUPLEX CAROTID BILATERAL      VITAMIN B12 & FOLATE      TSH 3RD GENERATION      ubrogepant (Ubrelvy) 50 mg tablet   2. Vertigo  R42 780.4 MRA BRAIN WO CONT      DUPLEX CAROTID BILATERAL      VITAMIN B12 & FOLATE      TSH 3RD GENERATION      ubrogepant (Ubrelvy) 50 mg tablet   3. Bilateral carotid artery stenosis  I65.23 433.10 MRA BRAIN WO CONT     433.30 DUPLEX CAROTID BILATERAL      VITAMIN B12 & FOLATE      TSH 3RD GENERATION      ubrogepant (Ubrelvy) 50 mg tablet   4. Vertebrobasilar artery insufficiency  G45.0 435.3 MRA BRAIN WO CONT      DUPLEX CAROTID BILATERAL      VITAMIN B12 & FOLATE      TSH 3RD GENERATION      ubrogepant (Ubrelvy) 50 mg tablet   5. Cervical vertigo  R42 780.4 MRA BRAIN WO CONT      DUPLEX CAROTID BILATERAL      VITAMIN B12 & FOLATE      TSH 3RD GENERATION      ubrogepant (Ubrelvy) 50 mg tablet   6.  B12 deficiency  E53.8 266.2 MRA BRAIN WO CONT      DUPLEX CAROTID BILATERAL      VITAMIN B12 & FOLATE      TSH 3RD GENERATION      ubrogepant (Ubrelvy) 50 mg tablet 7. Hypothyroidism due to acquired atrophy of thyroid  E03.4 244.8 MRA BRAIN WO CONT     246.8 DUPLEX CAROTID BILATERAL      VITAMIN B12 & FOLATE      TSH 3RD GENERATION      ubrogepant (Ubrelvy) 50 mg tablet   8. Cervicogenic headache  R51 784.0 MRA BRAIN WO CONT      DUPLEX CAROTID BILATERAL      VITAMIN B12 & FOLATE      TSH 3RD GENERATION      ubrogepant (Ubrelvy) 50 mg tablet     Active Problems:    * No active hospital problems. *      Plan:     Patient with history of Ménière's disease, and his symptoms sound classic for that disease, and he has normal MRI of the brain and of the internal auditory canals done November 2019 and a normal exam neurologically essentially today, except for the hearing loss in the left ear  I advised the patient we could check an MRI of the brain making sure there is no vascular lesions in the posterior fossa that could account for his symptoms, will also check a carotid Doppler just to make sure there is no obstructive cerebrovascular disease or could be causing dizziness or vertigo, but I doubt these are the cause of his symptoms. He is already getting therapy for his arthritis in his neck which is causing cervicogenic vertigo and cervicogenic headaches and advised him that the reason his dizziness is worse with the neck pain is from cervicogenic vertigo and his headaches also get worse when he has a lot of neck pain from cervicogenic headaches I doubt we will find anything structurally wrong with his vessels in his brain. Since he is doing so much better for the last 3 weeks we advised him to continue his current therapy and work closely with his ENT for his Ménière's disease  He is going to continue his Antivert and as needed diuretics for his vertigo. We did check a B12 and thyroid test just to make sure there is no other cause for his vertigo. We will follow-up in 6 months time, he will check my chart for results of his tests or call us afterwards.   For his headaches since he cannot take Tylenol compounds because of his liver problems, we will try him on you really if he can get insurance coverage, and Fioricet seems to interact with his immunosuppressive drugs. At his age he is not a good candidate for triptans. Treating his headaches will be difficult. .  Signed By: Wesley Lesch, MD     September 18, 2020       CC: Jaylin Holder MD  FAX: [phone]    This note will not be viewable in [address]

## 2020-09-22 LAB
FOLATE SERPL-MCNC: 9.3 NG/ML
TSH SERPL DL<=0.005 MIU/L-ACNC: 1.28 UIU/ML (ref 0.45–4.5)
VIT B12 SERPL-MCNC: 675 PG/ML (ref 232–1245)

## 2020-09-23 ENCOUNTER — TELEPHONE (OUTPATIENT)
Dept: NEUROLOGY | Age: 65
End: 2020-09-23

## 2020-09-23 NOTE — TELEPHONE ENCOUNTER
----- Message from Heber Raymond sent at 9/23/2020 12:37 PM EDT -----  Regarding: Dr. Catie Kaur  General Message/Vendor Calls    Caller's first and last name:Holly Bynum      Reason for call:MRA appt      Callback required yes/no and why:yes      Best contact number(s):[phone]      Details to clarify the request:Pt requested a call to schedule an MRA.       Heber Raymond

## 2020-09-24 PROCEDURE — 93880 EXTRACRANIAL BILAT STUDY: CPT | Performed by: PSYCHIATRY & NEUROLOGY

## 2020-09-25 ENCOUNTER — TELEPHONE (OUTPATIENT)
Dept: NEUROLOGY | Age: 65
End: 2020-09-25

## 2020-09-25 NOTE — TELEPHONE ENCOUNTER
Received call from pt, he stated that Dr. Adolfo Savage prescribed medication last week, and the cost is $400 and he cannot afford. He would like something else prescribed please.

## 2020-09-25 NOTE — TELEPHONE ENCOUNTER
Denilson Quan was approved - not sure if the 400.00 the patient was referring was before or after the PA was done. Pt has a Medicare plan - he would not be eligible to use the access card.      neela Amin

## 2020-09-30 DIAGNOSIS — G44.86 CERVICOGENIC HEADACHE: Primary | ICD-10-CM

## 2020-09-30 RX ORDER — METHOCARBAMOL 500 MG/1
500 TABLET, FILM COATED ORAL
Qty: 100 TAB | Refills: 5 | Status: SHIPPED | OUTPATIENT
Start: 2020-09-30

## 2020-10-01 ENCOUNTER — TELEPHONE (OUTPATIENT)
Dept: NEUROLOGY | Age: 65
End: 2020-10-01

## 2020-10-01 NOTE — TELEPHONE ENCOUNTER
----- Message from Tivis Senegal sent at 9/30/2020  4:30 PM EDT -----  Regarding: Dr. Gweneth Romberg telephone  Caller's first and last name: self   Reason for call: problem with medication   Callback required yes/no and why: yes  Best contact number(s): [phone]  Details to clarify the request: PT is a liver transplant patient and cannot take the medication that was prescribed to him without doing weekly blood work to check on him, this is his 2nd call on this matter

## 2020-10-15 ENCOUNTER — DOCUMENTATION ONLY (OUTPATIENT)
Dept: NEUROLOGY | Age: 65
End: 2020-10-15

## 2020-10-15 ENCOUNTER — HOSPITAL ENCOUNTER (OUTPATIENT)
Dept: MRI IMAGING | Age: 65
Discharge: HOME | End: 2020-10-15
Attending: PSYCHIATRY & NEUROLOGY
Payer: MEDICARE

## 2020-10-15 DIAGNOSIS — E03.4 HYPOTHYROIDISM DUE TO ACQUIRED ATROPHY OF THYROID: ICD-10-CM

## 2020-10-15 DIAGNOSIS — I65.23 BILATERAL CAROTID ARTERY STENOSIS: ICD-10-CM

## 2020-10-15 DIAGNOSIS — R42 CERVICAL VERTIGO: ICD-10-CM

## 2020-10-15 DIAGNOSIS — H81.02 MENIERE'S DISEASE OF LEFT EAR: ICD-10-CM

## 2020-10-15 DIAGNOSIS — G44.86 CERVICOGENIC HEADACHE: ICD-10-CM

## 2020-10-15 DIAGNOSIS — G45.0 VERTEBROBASILAR ARTERY INSUFFICIENCY: ICD-10-CM

## 2020-10-15 DIAGNOSIS — G45.0 VERTEBROBASILAR ARTERY INSUFFICIENCY: Primary | ICD-10-CM

## 2020-10-15 DIAGNOSIS — E53.8 B12 DEFICIENCY: ICD-10-CM

## 2020-10-15 DIAGNOSIS — R42 VERTIGO: ICD-10-CM

## 2020-10-15 PROCEDURE — 70544 MR ANGIOGRAPHY HEAD W/O DYE: CPT

## 2020-10-16 NOTE — PROGRESS NOTES
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

## 2020-10-27 ENCOUNTER — HOSPITAL ENCOUNTER (OUTPATIENT)
Dept: CT IMAGING | Age: 65
Discharge: HOME | End: 2020-10-27
Attending: PSYCHIATRY & NEUROLOGY
Payer: MEDICARE

## 2020-10-27 DIAGNOSIS — G45.0 VERTEBROBASILAR ARTERY INSUFFICIENCY: ICD-10-CM

## 2020-10-27 LAB — CREAT BLD-MCNC: 1.4 MG/DL (ref 0.6–1.3)

## 2020-10-27 PROCEDURE — 74011000636 HC RX REV CODE- 636: Performed by: PSYCHIATRY & NEUROLOGY

## 2020-10-27 PROCEDURE — 70498 CT ANGIOGRAPHY NECK: CPT

## 2020-10-27 PROCEDURE — 82565 ASSAY OF CREATININE: CPT

## 2020-10-27 RX ORDER — SODIUM CHLORIDE 0.9 % (FLUSH) 0.9 %
10 SYRINGE (ML) INJECTION
Status: COMPLETED | OUTPATIENT
Start: 2020-10-27 | End: 2020-10-27

## 2020-10-27 RX ADMIN — Medication 10 ML: at 14:44

## 2020-10-27 RX ADMIN — IOPAMIDOL 100 ML: 755 INJECTION, SOLUTION INTRAVENOUS at 14:44

## 2020-10-28 ENCOUNTER — DOCUMENTATION ONLY (OUTPATIENT)
Dept: NEUROLOGY | Age: 65
End: 2020-10-28

## 2020-10-28 NOTE — PROGRESS NOTES
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 Georges Hernandez

## 2020-11-18 ENCOUNTER — OFFICE VISIT (OUTPATIENT)
Dept: NEUROLOGY | Age: 65
End: 2020-11-18
Payer: MEDICARE

## 2020-11-18 VITALS
DIASTOLIC BLOOD PRESSURE: 80 MMHG | SYSTOLIC BLOOD PRESSURE: 126 MMHG | WEIGHT: 295.6 LBS | BODY MASS INDEX: 35.98 KG/M2 | RESPIRATION RATE: 16 BRPM

## 2020-11-18 DIAGNOSIS — G44.86 CERVICOGENIC HEADACHE: ICD-10-CM

## 2020-11-18 DIAGNOSIS — H81.02 MENIERE'S DISEASE OF LEFT EAR: ICD-10-CM

## 2020-11-18 DIAGNOSIS — G44.209 TENSION VASCULAR HEADACHE: ICD-10-CM

## 2020-11-18 DIAGNOSIS — M47.812 CERVICAL SPINE ARTHRITIS: ICD-10-CM

## 2020-11-18 DIAGNOSIS — M54.2 NECK PAIN: ICD-10-CM

## 2020-11-18 DIAGNOSIS — R42 VERTIGO: ICD-10-CM

## 2020-11-18 DIAGNOSIS — M54.81 OCCIPITAL NEURALGIA OF LEFT SIDE: ICD-10-CM

## 2020-11-18 DIAGNOSIS — G43.109 MIGRAINE WITH VERTIGO: Primary | ICD-10-CM

## 2020-11-18 PROCEDURE — G8427 DOCREV CUR MEDS BY ELIG CLIN: HCPCS | Performed by: PSYCHIATRY & NEUROLOGY

## 2020-11-18 PROCEDURE — G8417 CALC BMI ABV UP PARAM F/U: HCPCS | Performed by: PSYCHIATRY & NEUROLOGY

## 2020-11-18 PROCEDURE — 99215 OFFICE O/P EST HI 40 MIN: CPT | Performed by: PSYCHIATRY & NEUROLOGY

## 2020-11-18 PROCEDURE — G9717 DOC PT DX DEP/BP F/U NT REQ: HCPCS | Performed by: PSYCHIATRY & NEUROLOGY

## 2020-11-18 PROCEDURE — 1101F PT FALLS ASSESS-DOCD LE1/YR: CPT | Performed by: PSYCHIATRY & NEUROLOGY

## 2020-11-18 PROCEDURE — G8536 NO DOC ELDER MAL SCRN: HCPCS | Performed by: PSYCHIATRY & NEUROLOGY

## 2020-11-18 PROCEDURE — 3017F COLORECTAL CA SCREEN DOC REV: CPT | Performed by: PSYCHIATRY & NEUROLOGY

## 2020-11-18 NOTE — PATIENT INSTRUCTIONS
So let us try to clear some information up    Ubrelvy which Dr. Carly Lopes prescribed for you to take when you get a headache as a rescue medicine is not metabolized by the liver  Use your co-pay card to reduce the price on that  Make sure your pharmacy knows that your prescriptions go through your private insurance not Medicare therefore they should be able to use the cart    I also recommend using Voltaren topical gel which you can buy over-the-counter  Up to 4 times a day as needed for neck pain and headache    Finally we can put you on some preventative medications to help with headache and neck pain  Also to help with your vertigo based migraines  Please clear this with your liver doctor to see if they are okay with you using any of the following:  Effexor starting at 37.5 mg and titrating to not more than 150 mg  Cymbalta starting at 20 mg and not titrating to more than 60 mg  Gabapentin starting low but possibly titrating as high as 1200 mg    Please send a note through my chart let me know what your liver doctor is comfortable with using    We will see you back after the new year    Have a great holiday season    Also remember that when you are feeling good to not push her activity level otherwise you are going to activate all your long-term issues and you are going to have a flareup of symptoms

## 2020-11-18 NOTE — PROGRESS NOTES
[address] 82   FOLLOW UP IN OFFICE VISIT    Crystal Tidwell is a 76 y.o. male who presents today for the following:  Chief Complaint   Patient presents with    Follow-up     Meniere's disease of left ear           HPI  Historical Data  Patient is known to the practice and has been previously seen by Dr. Jayashree Rae    Neurologic diagnosis  Dizziness and vertigo   Date of onset 2007    Diagnosed with Ménière's disease   Severe pressure sensation in her ear   Hearing loss on the left   Severe bouts of intermittent vertigo   Evaluated by 3 different ENT specialist   Steroid injections tried as recent as March 2020   MRI with attention to the IACs November 2019 was normal   Historically has used Antivert as needed   Hearing loss LT ear    Headaches migraines   Started at the base of the skull and radiates to the frontal region   Associated with increased dizziness and vertigo    Other associated symptoms blurred vision    Neck pain   Cervical arthritis   Sees a chiropractor   In the past has been seen by orthopedic as well; as far as we understand he is not a surgical candidate    Other significant comorbid conditions  Idiopathic cirrhosis   Subsequently developed liver cancer    Status post liver transplant and is on chronic antirejection therapy  History of sarcoidosis  History of DVT   On chronic anticoagulation therapy    Patient has longstanding history of playing football    Diagnostic testing  Carotid duplex studies completed 9/24/2020   Normal ICA with less than 15% stenosis   Vertebral artery: Right vertebral artery unable to be visualized thought most likely due to technical difficulty but cannot rule out obstruction or congenital cerebrovascular disease    CTA head neck completed October 27, 2020   Left dominant vertebral artery with mild hypoplastic right vertebral artery   Patent posterior inferior cerebellar arteries bilaterally   Patent AICA bilaterally          Interim Data:     Vertigo   Last bout was in August and it was affiliated with a severe migraine and he needed to go to bed  He reports no bouts of vertigo not associated with headache or migraine    Headaches migraines  Still gets low-grade headaches several times per month  He feels chiropractic treatment has been helpful  Usually eats affiliated when he \"overdoes it\"  His smaller headaches are generally not associated with vertigo  Frequently the headaches start at the base and come up over the left side of the head   Rubbing his neck or massaging it often makes it feel better  And again is severe migraines that incapacitate him are always associated with severe vertigo    Neck pain  Baseline for patient  Continues with chiropractic which he finds helpful   Patient reports there is high velocity manipulations with chiropractic therapy    Test results were reviewed with the patient at office visit of 11/18/2020  Patient was given time to ask questions and voiced concerns  I believe all questions and concerns were adequately addressed at today's office visit    No Known Allergies    Current Outpatient Medications   Medication Sig    methocarbamoL (ROBAXIN) 500 mg tablet Take 1 Tab by mouth three (3) times daily as needed for Muscle Spasm(s).  mycophenolate mofetil (CELLCEPT) 250 mg capsule Take 1,000 mg by mouth daily.  tacrolimus (PROGRAF) 1 mg capsule Take 1 mg by mouth three (3) times daily.  predniSONE (DELTASONE) 5 mg tablet Take 5 mg by mouth daily.  meclizine (ANTIVERT) 12.5 mg tablet Take 25 mg by mouth as needed.  magnesium oxide 500 mg tab Take 500 mg by mouth daily.  ubrogepant [place] Robby) 50 mg tablet 1 PO att onset of headache and can repeat in 2 hours if needed, max dose 2 per day    warfarin (COUMADIN) 2 mg tablet 3 mg (1.5 tabs) on Mon Weds Fri  2 mg (1 Tab) on the other days    triamcinolone (NASACORT AQ) 55 mcg nasal inhaler 2 Sprays as needed.     acetaminophen (TYLENOL) 500 mg tablet Take  by mouth every six (6) hours as needed for Pain. No current facility-administered medications for this visit.         Past Medical History:   Diagnosis Date    Cancer [facility])     Liver cancer    Cervical vertigo 9/18/2020    Cervicogenic headache 9/18/2020    Depression     Elevated liver enzymes 1/11/2018    Headache     History of pulmonary embolism 6/21/2016    Hypothyroidism due to acquired atrophy of thyroid 9/18/2020    Meniere disease 6/21/2016    Recurrent depression ([address] 1/11/2018    Sarcoid     Full recovery    Vertebrobasilar artery insufficiency 9/18/2020    Vertigo 9/18/2020       Past Surgical History:   Procedure Laterality Date    HX ACL RECONSTRUCTION  1997    HX CHOLECYSTECTOMY  2006    HX COLONOSCOPY  1/1/2013    HX TONSIL AND ADENOIDECTOMY         Family History   Problem Relation Age of Onset    Diabetes Mother     Stroke Mother     Dementia Mother     Migraines Mother     Heart Disease Mother     Neuropathy Mother     Cancer Father         lung    COPD Father        Social History     Socioeconomic History    Marital status:      Spouse name: Not on file    Number of children: 3    Years of education: Not on file    Highest education level: Not on file   Occupational History    Occupation: retired   Tobacco Use    Smoking status: Former Smoker     Packs/day: 0.25     Years: 15.00     Pack years: 3.75     Types: Cigars     Last attempt to quit: 12/12/2016     Years since quitting: 3.9    Smokeless tobacco: Never Used    Tobacco comment: Still smokes Cigars   Substance and Sexual Activity    Alcohol use: No     Alcohol/week: 0.0 standard drinks    Drug use: No    Sexual activity: Yes     Partners: Female   Social History Narrative    Lives with wife          ROS  Other than what is stated above under HPI there is no new or changing issues related to the following:  Constitutional: No recent weight change, fever,fatigue, sleep difficulties, or loss of appetite. ENT/Mouth:  No hearing loss, ringing in the ears, chronic sinus problem, nose bleeds sore throat, voice change, hoarseness, swollen glands in neck, or difficulties with chewing and swallowing. Cardiovascular:  No chest pain/angina pectoris, palpitations,swelling of feet/ankles/hands, or calf pain while walking. Respiratory: No chronic or frequent coughs, spitting up blood, shortness of breath, asthma, or wheezing. Gastrointestinal: No abdominal pain, heartburn, nausea, vomiting, constipation, frequent diarrhea, rectal bleeding, or blood in stool. Genitourinary: No frequent urination, burning or painful urination, blood in urine, incontinence or dribbling. Musculoskeletal:   No joint pain, stiffness/swelling, weakness of muscles, muscle pain/cramp, or back pain. Integument:   No rash/itching, change in skin color, change in hair/nails, or change in color/size of moles. Neurological:  No dizziness/vertigo, loss of consciousness, numbness/tingling sensation, tremors, weakness in limbs, difficulty with balance, frequent or recurring headaches, memory loss or confusion. Psychiatric:   No nervousness, depression, hallucinations, paranoia or suspiciousness. Endocrine: No excessive thirst or urination, heat or cold intolerance. Hematologic/Lymphatic: No bleeding/bruising tendency, phlebitis, or past transfusion. EXAMINATION  Visit Vitals  /80 (BP 1 Location: Left arm, BP Patient Position: Sitting)   Resp 16   Wt 295 lb 9.6 oz (134.1 kg)   BMI 35.98 kg/m²          General appearance: Patient is well-developed and well-nourished in no apparent distress and well groomed.     Psych/mental health:  Affect: Appropriate    PHQ  3 most recent PHQ Screens 1/11/2018   PHQ Not Done -   Little interest or pleasure in doing things Not at all   Feeling down, depressed, irritable, or hopeless Not at all   Total Score PHQ 2 0       HEENT:   Normocephalic  With evidence of trauma: No  Full range of motion head neck: Yes  Tenderness to palpation of the head neck region: No      Cardiovascular:   Extremities warm to touch: Yes   Extremity swelling: No  Discoloration: No  Evidence of PVD: No    Respiratory:   Dyspnea on exertion: No   Normal effort on casual observation: No   Use of portable oxygen: No   Evidence of cyanosis: No     Musculoskeletal:   Evidence of significant bone deformities: No   Spinal curvature: No     Integumentary:    Obvious bruising: No   Lacerations or discoloration on casual observation: No       Neurological Examination:   Mental Status:        MMSE  No flowsheet data found. Formal testing was not completed    there was nothing concerning on general observation and discussion.    Alert oriented and appropriate to general conversation  Normal processing on general observation  Followed conversation and responded seemingly appropriate throughout the office visit  No word finding difficulties noted on casual observation  Able to follow directions without difficulty     Cranial Nerves:    PERRLA,   EOMs intact gaze is conjugate  No nystagmus is appreciated  No DEISY  Facial motor and sensory intact bilaterally  Hearing intact to conversation  Voice with normal projection, no evidence of secretion pooling  Palate elevates symmetrically  Shoulder shrug intact bilaterally  No tongue deviation appreciated     Motor:   Normal tone and bulk  Fine dexterity intact bilaterally  No tremor appreciated on today's exam  No abnormal movements appreciated on today's exam    Muscle strength testing:  Right side  Upper extremities  Deltoid 5/5  Bicep 5/5  Tricep 5/5  Hand grasp 5/5    Lower extremity  Hip flexor 5/5  Extensor 5/5  Flexor 5/5  Plantar flexion 5/5  Dorsiflexion 5/5      Left side  Upper extremity  Deltoid 5/5  Bicep 5/5  Tricep 5/5  Hand grasp 5/5    Lower extremity  Hip flexor 5/5  Extensor 5/5  Flexor 5/5  Plantar flexion 5/5  Dorsiflexion 5/5      Sensation: Intact to light touch bilaterally    Coordination/Cerebellar:   Grossly intact    Gait: Ambulates independently    Fall risk assessment  Fall Risk Assessment, last 12 mths 11/18/2020   Able to walk? Yes   Fall in past 12 months? No   Fall with injury? -   Number of falls in past 12 months -   Fall Risk Score -       Reflexes: Not tested    ASSESSMENT AND PLAN    Headaches and migraines: Mixed   Vertiginous migraines    This is independent of his Ménière's    These of the migraines tend to be incapacitating for the patient     Occipital neuralgia with tension vascular headaches    Aggravated by muscle spasm and underlying cervical spine arthritis and chronic neck pain    Ménière's disease   Separate issue related to his migraines as discussed above   Seemingly reasonably stable    Chronic neck pain due to cervical arthritis   History of contacts to sports certainly contributory   Continue with chiropractic care.   Again I am not thrilled about the high velocity manipulation I have asked the patient to discuss this further with his chiropractic specialist    Complicating management is his history of liver transplant on chronic rejection therapy   Medications need to be cleared through his liver specialist    Tia Apo is not cleared by the liver so I have suggested that the patient go ahead and pick that up from the pharmacy with the use of his co-pay card [patient's pharmacy benefits go through his private insurance]   He is to use this for severe vertiginous migraine rescue medication    I would also like him on preventative medication for the vertiginous migraines   Medications such as NSRI's tend to be very effective   I have asked him to get clearance from his liver specialist regarding the use of this before we prescribe it    For the occipital neuralgia with vascular tension headaches   Gabapentin would probably be an excellent choice both from a preventative and rescue medication   Again I have asked him to get clearance with his liver specialist before I prescribe this     Additionally Voltaren gel will probably be effective as well. He is on chronic anticoagulation therapy but since this is topical it is doubtful enough will be absorbed to interfere with his anticoagulation therapy      Additionally greater than 50% of this office visit was in consultation related to diagnostic testing results, the different types of headaches and migraines and treatment options available to us but keeping in mind the concerns related to his liver              ICD-10-CM ICD-9-CM    1. Migraine with vertigo  G43.109 346.80      780.4    2. Occipital neuralgia of left side  M54.81 723.8    3. Meniere's disease of left ear  H81.02 386.00    4. Vertigo  R42 780.4    5. Cervicogenic headache  R51.9 784.0    6. Tension vascular headache  G44.209 307.81    7. Neck pain  M54.2 723.1    8. Cervical spine arthritis  M47.812 721.0            Additional pertinent medical data reviewed at today's office visit:       Hospital Outpatient Visit on 10/27/2020   Component Date Value    Creatinine (POC) 10/27/2020 1.4*    GFRAA, POC 10/27/2020 >60     GFRNA, POC 10/27/2020 51*   Office Visit on 09/18/2020   Component Date Value    Vitamin B12 09/21/2020 675     Folate 09/21/2020 9.3     TSH 09/21/2020 1.280        XR Results (maximum last 3): No results found for this or any previous visit. CT Results (maximum last 3): Results from [place] encounter on 10/27/20   CTA HEAD NECK W CONT    Impression IMPRESSION:     There is no aneurysm dissection or hemodynamically significant stenosis. Dominant left vertebral artery with mildly hypoplastic right vertebral artery. Patent posterior inferior cerebellar arteries on the right and on the left. AICA is patent on the right and on the left as well. Carli Cervantes MRI Results (maximum last 3):   Results from [place] encounter on 10/15/20   MRA BRAIN WO CONT    Impression IMPRESSION:    Asymmetric basilar artery branches as detailed above, including nonvisualization  of the left AICA. This could reflect very diminutive size, congenital  hypoplasia, or less likely chronic occlusion. Results from [place] encounter on 11/26/19   MRI IAC W WO CONT    Impression IMPRESSION: Negative examination.                Clau Encarnacion MS, ANP-BC, [facility]

## 2020-11-20 ENCOUNTER — PATIENT MESSAGE (OUTPATIENT)
Dept: NEUROLOGY | Age: 65
End: 2020-11-20

## 2020-11-20 DIAGNOSIS — G43.809 OTHER MIGRAINE WITHOUT STATUS MIGRAINOSUS, NOT INTRACTABLE: Primary | ICD-10-CM

## 2020-11-23 RX ORDER — VENLAFAXINE HYDROCHLORIDE 37.5 MG/1
37.5 CAPSULE, EXTENDED RELEASE ORAL DAILY
Qty: 30 CAP | Refills: 1 | Status: SHIPPED | OUTPATIENT
Start: 2020-11-23 | End: 2021-01-05 | Stop reason: SDUPTHER

## 2020-11-23 NOTE — TELEPHONE ENCOUNTER
Mervin Brunner 11/23/2020 1:52 PM EST      ----- Message -----  From: León Renner LPN  Sent: [id number]/[id number] 10:55 AM EST  To: Sue Call  Subject: FW: Prescription Question       ----- Message -----  From: Zhane Dang  Sent: 11/20/2020 9:15 AM EST  To: [facility] Nurse Pool  Subject: Prescription Question     [facility] Response     No current issues with Melissa Dailey. There are drug interactions in generalized with this drug but not for his med list.  As far as the others, those are generally safe. I hope that's a list of possible medications and the plan isn't to begin all three. Effexor and Cymbalta are in the same drug class.

## 2020-11-23 NOTE — TELEPHONE ENCOUNTER
Effexor 37.5 mg 1 daily sent to pharmacy overall plans and strategies will be to escalate this as needed or tolerated to a total of 150 mg

## 2021-01-05 DIAGNOSIS — G43.809 OTHER MIGRAINE WITHOUT STATUS MIGRAINOSUS, NOT INTRACTABLE: ICD-10-CM

## 2021-01-05 RX ORDER — VENLAFAXINE HYDROCHLORIDE 37.5 MG/1
37.5 CAPSULE, EXTENDED RELEASE ORAL DAILY
Qty: 30 CAP | Refills: 1 | Status: SHIPPED | OUTPATIENT
Start: 2021-01-05 | End: 2021-02-16 | Stop reason: SDUPTHER

## 2021-02-16 ENCOUNTER — VIRTUAL VISIT (OUTPATIENT)
Dept: NEUROLOGY | Age: 66
End: 2021-02-16
Payer: MEDICARE

## 2021-02-16 DIAGNOSIS — G43.109 MIGRAINE WITH VERTIGO: ICD-10-CM

## 2021-02-16 DIAGNOSIS — M54.2 NECK PAIN: ICD-10-CM

## 2021-02-16 DIAGNOSIS — R42 VERTIGO: ICD-10-CM

## 2021-02-16 DIAGNOSIS — H81.02 MENIERE'S DISEASE OF LEFT EAR: ICD-10-CM

## 2021-02-16 DIAGNOSIS — Z79.01 CHRONIC ANTICOAGULATION: ICD-10-CM

## 2021-02-16 DIAGNOSIS — G43.809 OTHER MIGRAINE WITHOUT STATUS MIGRAINOSUS, NOT INTRACTABLE: Primary | ICD-10-CM

## 2021-02-16 DIAGNOSIS — G44.209 TENSION VASCULAR HEADACHE: ICD-10-CM

## 2021-02-16 DIAGNOSIS — G43.809 OTHER MIGRAINE WITHOUT STATUS MIGRAINOSUS, NOT INTRACTABLE: ICD-10-CM

## 2021-02-16 DIAGNOSIS — G44.86 CERVICOGENIC HEADACHE: ICD-10-CM

## 2021-02-16 DIAGNOSIS — M54.81 OCCIPITAL NEURALGIA OF LEFT SIDE: ICD-10-CM

## 2021-02-16 PROBLEM — N18.31 STAGE 3A CHRONIC KIDNEY DISEASE (HCC): Status: ACTIVE | Noted: 2021-02-16

## 2021-02-16 PROBLEM — D86.0 SARCOIDOSIS, LUNG (HCC): Status: ACTIVE | Noted: 2018-07-09

## 2021-02-16 PROBLEM — C22.9 CANCER OF LIVER (HCC): Status: ACTIVE | Noted: 2019-01-16

## 2021-02-16 PROBLEM — H81.03 MENIERE'S DISEASE OF BOTH EARS: Status: ACTIVE | Noted: 2018-07-09

## 2021-02-16 PROBLEM — Z94.4 LIVER TRANSPLANTED (HCC): Status: ACTIVE | Noted: 2021-02-16

## 2021-02-16 PROBLEM — K74.60 CIRRHOSIS OF LIVER WITHOUT ASCITES (HCC): Status: ACTIVE | Noted: 2019-01-09

## 2021-02-16 PROCEDURE — 3017F COLORECTAL CA SCREEN DOC REV: CPT | Performed by: PSYCHIATRY & NEUROLOGY

## 2021-02-16 PROCEDURE — G9717 DOC PT DX DEP/BP F/U NT REQ: HCPCS | Performed by: PSYCHIATRY & NEUROLOGY

## 2021-02-16 PROCEDURE — G8536 NO DOC ELDER MAL SCRN: HCPCS | Performed by: PSYCHIATRY & NEUROLOGY

## 2021-02-16 PROCEDURE — 99214 OFFICE O/P EST MOD 30 MIN: CPT | Performed by: PSYCHIATRY & NEUROLOGY

## 2021-02-16 PROCEDURE — 1101F PT FALLS ASSESS-DOCD LE1/YR: CPT | Performed by: PSYCHIATRY & NEUROLOGY

## 2021-02-16 PROCEDURE — G8427 DOCREV CUR MEDS BY ELIG CLIN: HCPCS | Performed by: PSYCHIATRY & NEUROLOGY

## 2021-02-16 PROCEDURE — G8417 CALC BMI ABV UP PARAM F/U: HCPCS | Performed by: PSYCHIATRY & NEUROLOGY

## 2021-02-16 RX ORDER — WARFARIN SODIUM 5 MG/1
5 TABLET ORAL DAILY
COMMUNITY
Start: 2020-12-16

## 2021-02-16 RX ORDER — VENLAFAXINE HYDROCHLORIDE 37.5 MG/1
37.5 CAPSULE, EXTENDED RELEASE ORAL DAILY
Qty: 90 CAP | Refills: 3 | Status: SHIPPED | OUTPATIENT
Start: 2021-02-16 | End: 2021-06-15 | Stop reason: DRUGHIGH

## 2021-02-16 NOTE — PATIENT INSTRUCTIONS
As per discussion Continue on the venlafaxine also known as Effexor 37.5 mg tablets daily to prevent vertigo and migraine. As we discussed last time there is a vertiginous component to your migraine as well. Huong Nereida can be used for rescue medicine of severe headache or migraine as needed At this point you are stable and per our discussion you prefer to follow-up with primary care and that is reasonable but we be happy to see you back as needed I strongly encourage you to use my chart if you need to contact us. Presently with a high utilization of telehealth it is very difficult to get through on her phone lines. If you have not already activated my chart or you forget your password their instructions in this packet to get my chart activated. We did not discuss this at today's office visit, but from a neurologic perspective, I do strongly recommend that you get the Covid vaccination when it becomes available to you there are no contraindications from a neurologic perspective or from a medical perspective based on our medical history review. Although you may want to discuss further with your primary care or another specialist to make sure there is no other concerns related to your health condition. Below is a website you can go to to get further information about the vaccine and vaccine scheduling through the state website and indirectly through [facility] system 
 
[email or web address] 
 
Next Steps: 
? If [address] patients reach out to you for more information on how to get a vaccine, please communicate the following: 
o Watch for a Test[employer] message that will let you know when you are eligible to schedule an appointment for a vaccine. o If you don't have [employer], go to [employer] and click ? atient Portal to sign up. o You can also watch our website for updates: [email or web address] ? Community members should complete the interest form on the [address] [place] [address] website and monitor their local health district website to learn more about additional vaccine distribution opportunities. Office Policies 
 
o Phone calls/patient messages: Please allow up to 24 hours for someone in the office to contact you about your call or message. Be mindful your provider may be out of the office or your message may require further review. We encourage you to use [employer] for your messages as this is a faster, more efficient way to communicate with our office 
 
o Medication Refills: 
Prescription medications require up to 48 business hours to process. We encourage you to use [employer] for your refills. For controlled medications: Please allow up to 72 business hours to process. Certain medications may require you to  a written prescription at our office. NO narcotic/controlled medications will be prescribed after 4pm Monday through Friday or on weekends 
 
o Form/Paperwork Completion: We ask that you allow 7-14 business days. You may also download your forms to [employer] to have your doctor print off.

## 2021-02-16 NOTE — PROGRESS NOTES
Chief Complaint   Patient presents with    Head Pain   1. Have you been to the ER, urgent care clinic since your last visit? Hospitalized since your last visit? NO    2. Have you seen or consulted any other health care providers outside of the [address] [place] [address] since your last visit? Include any pap smears or colon screening.   NO

## 2021-02-16 NOTE — PROGRESS NOTES
Kimm[address] 82  TELEHEALTH Virtual FOLLOW-UP VISIT        Jeanette Bob is a 76 y.o. male who was seen by synchronous (real-time) audio-video technology on 2/16/2021. Jeanette Bob is a 76 y.o. male who presents today for the following:  Chief Complaint   Patient presents with    Head Pain         ASSESSMENT AND PLAN    Headaches and migraines: Mixed etiology  Dramatically improved with low-dose Effexor 37.5 mg daily  Can continue Ubrelvy as needed 100 mg dose  At this point the patient would like to follow-up as needed and I think that is reasonable. But if his symptoms return would be happy to see him back for further evaluation and intervention adjustments        ICD-10-CM ICD-9-CM    1. Other migraine without status migrainosus, not intractable  G43.809 346.80    2. Migraine with vertigo  G43.109 346.80      780.4    3. Occipital neuralgia of left side  M54.81 723.8    4. Vertigo  R42 780.4    5. Meniere's disease of left ear  H81.02 386.00    6. Neck pain  M54.2 723.1    7. Tension vascular headache  G44.209 307.81    8. Cervicogenic headache  R51.9 784.0    9. Chronic anticoagulation  Z79.01 V58.61          I attest that 30 minutes was spent on today's visit reviewing medical records and diagnostic testing deemed pertinent to this patient's care, along with direct time spent at patient's visit including the history, physical assessment and plan, discussing diagnosis and management along with documentation.       HPI  Historical Data  Patient is known to the practice and has been previously seen by Dr. Norberto Sumner     Neurologic diagnosis  Dizziness and vertigo             Date of onset 2007                        Diagnosed with Ménière's disease             Severe pressure sensation in her ear             Hearing loss on the left             Severe bouts of intermittent vertigo             Evaluated by 3 different ENT specialist             Steroid injections tried as recent as March 2020             MRI with attention to the IACs November 2019 was normal             Historically has used Antivert as needed             Hearing loss LT ear     Headaches migraines             Started at the base of the skull and radiates to the frontal region             Associated with increased dizziness and vertigo               Other associated symptoms blurred vision     Neck pain             Cervical arthritis             Sees a chiropractor             In the past has been seen by orthopedic as well; as far as we understand he is not a surgical candidate     Other significant comorbid conditions  Idiopathic cirrhosis             Subsequently developed liver cancer                        Status post liver transplant and is on chronic antirejection therapy  History of sarcoidosis  History of DVT             On chronic anticoagulation therapy     Patient has longstanding history of playing football     Diagnostic testing  Carotid duplex studies completed 9/24/2020             Normal ICA with less than 15% stenosis             Vertebral artery: Right vertebral artery unable to be visualized thought most likely due to technical difficulty but cannot rule out obstruction or congenital cerebrovascular disease     CTA head neck completed October 27, 2020             Left dominant vertebral artery with mild hypoplastic right vertebral artery             Patent posterior inferior cerebellar arteries bilaterally             Patent AICA bilaterally                               Interim Data:      Vertigo   Last bout was in August 2020 and it was affiliated with a severe migraine and he needed to go to bed  He reports no bouts of vertigo not associated with headache or migraine  Since starting on the Effexor low-dose 37.5 mg tablet per day, no further bouts of vertigo to date     Headaches migraines  Still gets low-grade headaches several times per month  He feels chiropractic treatment has been helpful  Usually eats affiliated when he \"overdoes it\"  His smaller headaches are generally not associated with vertigo  Frequently the headaches start at the base and come up over the left side of the head             Rubbing his neck or massaging it often makes it feel better  And again is severe migraines that incapacitate him are always associated with severe vertigo  OV 2/16/2021: Only 1 severe migraine since last seen. He did not get the Ubrelvy due to cost so he just took some Tylenol which was not very effective     Neck pain  Baseline for patient  Continues with chiropractic which he finds helpful             Patient reports there is high velocity manipulations with chiropractic therapy     Test results were reviewed with the patient at office visit of 11/18/2020          No Known Allergies    Current Outpatient Medications   Medication Sig    warfarin (COUMADIN) 5 mg tablet Take 5 mg by mouth daily.  venlafaxine-SR (EFFEXOR-XR) 37.5 mg capsule Take 1 Cap by mouth daily.  methocarbamoL (ROBAXIN) 500 mg tablet Take 1 Tab by mouth three (3) times daily as needed for Muscle Spasm(s).  mycophenolate mofetil (CELLCEPT) 250 mg capsule Take 1,000 mg by mouth daily.  tacrolimus (PROGRAF) 1 mg capsule Take 1 mg by mouth three (3) times daily.  predniSONE (DELTASONE) 5 mg tablet Take 5 mg by mouth daily.  meclizine (ANTIVERT) 12.5 mg tablet Take 25 mg by mouth as needed.  magnesium oxide 500 mg tab Take 500 mg by mouth daily.  triamcinolone (NASACORT AQ) 55 mcg nasal inhaler 2 Sprays as needed.  acetaminophen (TYLENOL) 500 mg tablet Take  by mouth every six (6) hours as needed for Pain.  ubrogepant Marda Calico) 50 mg tablet 1 PO att onset of headache and can repeat in 2 hours if needed, max dose 2 per day    warfarin (COUMADIN) 2 mg tablet 3 mg (1.5 tabs) on Mon Weds Fri  2 mg (1 Tab) on the other days     No current facility-administered medications for this visit.         Past medical history/surgical history, family history, and social history have been reviewed for today's visit      ROS    A ten system review of constitutional, cardiovascular, respiratory, musculoskeletal, endocrine, skin, SHEENT, genitourinary, psychiatric and neurologic systems was obtained and is unremarkable except as mentioned under HPI          EXAMINATION: Within the context of virtual telehealth visit:    General appearance: Patient is well-developed and well-nourished in no apparent distress and well groomed. Psych/mental health:  Affect: Appropriate    PHQ  3 most recent PHQ Screens 1/11/2018   PHQ Not Done -   Little interest or pleasure in doing things Not at all   Feeling down, depressed, irritable, or hopeless Not at all   Total Score PHQ 2 0       HEENT:   Normocephalic  With evidence of trauma: No  Full range of motion head neck: Yes  Tenderness to palpation of the head neck region: N/A      Cardiovascular:     Extremities warm to touch: N/A  Extremity swelling: No  Discoloration: No  Evidence of PVD: No    Respiratory:   Dyspnea on exertion: No   Abnormal effort on casual observation: No   Use of portable oxygen: No   Evidence of cyanosis: No     Musculoskeletal:   Evidence of significant bone deformities: No   Spinal curvature: No     Integumentary:    Obvious bruising: No   Lacerations or discoloration on casual observation: No       Neurological Examination:   Mental Status:        MMSE  No flowsheet data found. Formal testing was not completed    there was nothing concerning on general observation and discussion.    Alert oriented and appropriate to general conversation  Normal processing on general observation  Followed conversation and responded seemingly appropriate throughout the office visit  No word finding difficulties noted on casual observation  Able to follow directions without difficulty     Cranial Nerves:    Grossly intact    Motor:   Normal bulk  No tremor appreciated on today's exam  No abnormal movements appreciated on today's exam  Moves extremities spontaneously and with purpose      Sensation: Not tested    Coordination/Cerebellar:   Grossly intact    Gait: Not tested    Fall risk assessment  Fall Risk Assessment, last 12 mths 11/18/2020   Able to walk? Yes   Fall in past 12 months? No   Number of falls in past 12 months -   Fall with injury? -           Due to this being a TeleHealth evaluation, many elements of the physical examination are unable to be assessed. Pursuant to the emergency declaration under the [address] [place] [address] waiver authority and the [employer] Act, this Virtual  Visit was conducted, with patient's consent, to reduce the patient's risk of exposure to COVID-19 and provide continuity of care for an established patient. Services were provided through a video synchronous discussion virtually to substitute for in-person clinic visit. Follow-up and Dispositions    · Return if symptoms worsen or fail to improve.    Follow-up and Disposition History            Marsha Cornell MS, ANP-BC, [facility]

## 2021-06-15 ENCOUNTER — OFFICE VISIT (OUTPATIENT)
Dept: NEUROLOGY | Age: 66
End: 2021-06-15
Payer: MEDICARE

## 2021-06-15 VITALS
OXYGEN SATURATION: 97 % | HEART RATE: 74 BPM | BODY MASS INDEX: 36.27 KG/M2 | SYSTOLIC BLOOD PRESSURE: 132 MMHG | RESPIRATION RATE: 15 BRPM | WEIGHT: 298 LBS | TEMPERATURE: 98.3 F | DIASTOLIC BLOOD PRESSURE: 92 MMHG

## 2021-06-15 DIAGNOSIS — Z79.01 CHRONIC ANTICOAGULATION: ICD-10-CM

## 2021-06-15 DIAGNOSIS — G44.209 TENSION VASCULAR HEADACHE: ICD-10-CM

## 2021-06-15 DIAGNOSIS — M54.81 OCCIPITAL NEURALGIA OF LEFT SIDE: ICD-10-CM

## 2021-06-15 DIAGNOSIS — G43.109 MIGRAINE WITH VERTIGO: ICD-10-CM

## 2021-06-15 DIAGNOSIS — G43.809 OTHER MIGRAINE WITHOUT STATUS MIGRAINOSUS, NOT INTRACTABLE: Primary | ICD-10-CM

## 2021-06-15 DIAGNOSIS — H81.02 MENIERE'S DISEASE OF LEFT EAR: ICD-10-CM

## 2021-06-15 DIAGNOSIS — M54.2 NECK PAIN: ICD-10-CM

## 2021-06-15 DIAGNOSIS — M47.812 CERVICAL SPINE ARTHRITIS: ICD-10-CM

## 2021-06-15 DIAGNOSIS — G44.86 CERVICOGENIC HEADACHE: ICD-10-CM

## 2021-06-15 DIAGNOSIS — R42 VERTIGO: ICD-10-CM

## 2021-06-15 PROCEDURE — G8417 CALC BMI ABV UP PARAM F/U: HCPCS | Performed by: PSYCHIATRY & NEUROLOGY

## 2021-06-15 PROCEDURE — G8427 DOCREV CUR MEDS BY ELIG CLIN: HCPCS | Performed by: PSYCHIATRY & NEUROLOGY

## 2021-06-15 PROCEDURE — 3017F COLORECTAL CA SCREEN DOC REV: CPT | Performed by: PSYCHIATRY & NEUROLOGY

## 2021-06-15 PROCEDURE — 99215 OFFICE O/P EST HI 40 MIN: CPT | Performed by: PSYCHIATRY & NEUROLOGY

## 2021-06-15 PROCEDURE — G9717 DOC PT DX DEP/BP F/U NT REQ: HCPCS | Performed by: PSYCHIATRY & NEUROLOGY

## 2021-06-15 PROCEDURE — G8536 NO DOC ELDER MAL SCRN: HCPCS | Performed by: PSYCHIATRY & NEUROLOGY

## 2021-06-15 PROCEDURE — 1101F PT FALLS ASSESS-DOCD LE1/YR: CPT | Performed by: PSYCHIATRY & NEUROLOGY

## 2021-06-15 RX ORDER — VENLAFAXINE HYDROCHLORIDE 75 MG/1
75 CAPSULE, EXTENDED RELEASE ORAL DAILY
Qty: 90 CAPSULE | Refills: 1 | Status: SHIPPED | OUTPATIENT
Start: 2021-06-15 | End: 2021-09-16

## 2021-06-15 NOTE — PROGRESS NOTES
[address]Lul 82  In Office FOLLOW-UP VISIT         Breana Miller is a 76 y.o. male who presents today for the following:  Chief Complaint   Patient presents with    Follow-up     moments of vertigo; have gone away,now having balance and fullness of ears         ASSESSMENT AND PLAN  Headaches and migraines: Mixed etiology  Dramatically improved with low-dose Effexor 37.5 mg daily but now with some breakthrough symptoms  Will increase Effexor to 75 mg/day a new prescription was sent to his pharmacy    Patient does have Ubrelvy 50 mg but cannot afford it most of his other medications for a rescue medication for headache and migraine are contraindicated due to other medical problems    Education regarding headache and migraine was discussed at today's office visit    Patient is probably experiencing some mild depression  Were increasing the Effexor for headache and migraine but this should be beneficial in this arena as well    Patient and/or family was given time to ask questions and voice concerns. I believe all questions concerns were adequately addressed at this  office visit. Patient and/or family also verbalized agreement and understanding of the above-stated plan      ICD-10-CM ICD-9-CM    1. Other migraine without status migrainosus, not intractable  G43.809 346.80 venlafaxine-SR (EFFEXOR-XR) 75 mg capsule   2. Migraine with vertigo  G43.109 346.80 venlafaxine-SR (EFFEXOR-XR) 75 mg capsule     780.4    3. Occipital neuralgia of left side  M54.81 723.8 venlafaxine-SR (EFFEXOR-XR) 75 mg capsule   4. Vertigo  R42 780.4    5. Meniere's disease of left ear  H81.02 386.00    6. Neck pain  M54.2 723.1    7. Tension vascular headache  G44.209 307.81 venlafaxine-SR (EFFEXOR-XR) 75 mg capsule   8. Cervicogenic headache  R51.9 784.0    9. Chronic anticoagulation  Z79.01 V58.61    10.  Cervical spine arthritis  M47.812 721.0          I attest that 40 minutes was spent on today's visit reviewing medical records and diagnostic testing deemed pertinent to this patient's care, along with direct time spent at patient's visit including the history, physical assessment and plan, discussing diagnosis and management along with documentation.       HPI  Historical Data  Patient is known to the practice and has been previously seen by Dr. Mitchell Zuniga     Neurologic diagnosis  Dizziness and vertigo             Date of onset 2007                        Diagnosed with Ménière's disease             Severe pressure sensation in her ear             Hearing loss on the left             Severe bouts of intermittent vertigo             Evaluated by 3 different ENT specialist             Steroid injections tried as recent as March 2020  [place] with attention to the IACs November 2019 was normal             Historically has used Antivert as needed             Hearing loss LT ear     Headaches migraines             Started at the base of the skull and radiates to the frontal region             Associated with increased dizziness and vertigo               Other associated symptoms blurred vision     Neck pain             Cervical arthritis             Sees a chiropractor             In the past has been seen by orthopedic as well; as far as we understand he is not a surgical candidate     Other significant comorbid conditions  Idiopathic cirrhosis             Subsequently developed liver cancer                        Status post liver transplant and is on chronic antirejection therapy  History of sarcoidosis  History of DVT             On chronic anticoagulation therapy     Patient has longstanding history of playing football     Diagnostic testing  Carotid duplex studies completed 9/24/2020             Normal ICA with less than 15% stenosis             Vertebral artery: Right vertebral artery unable to be visualized thought most likely due to technical difficulty but cannot rule out obstruction or congenital cerebrovascular disease     CTA head neck completed October 27, 2020             Left dominant vertebral artery with mild hypoplastic right vertebral artery             Patent posterior inferior cerebellar arteries bilaterally             Patent AICA bilaterally                               Interim Data:     Here for follow-up regarding vertigo headaches and neck pain     Vertigo  Overall is been reasonably well controlled since starting low-dose Effexor 37.5 mg/day  He still feels as though there is some ear fullness on the left side     Headaches migraines  He is having headaches for up to 3 days at a time from the best I can tell its not frequent but enough to be bothersome  It usually on the left side  Stress and barometric pressure changes seem to be a major trigger     Neck pain  Baseline for patient  Continues with chiropractic which he finds helpful             BXE[facility] reports there is high velocity manipulations with chiropractic therapy      New complaint as of 6/15/2021  Patient states that he seems more withdrawn, lack of interest, more irritable mentions possibility of being more depressed      Results from [place] encounter on 10/15/20    MRA BRAIN WO CONT    Impression  IMPRESSION:  Asymmetric basilar artery branches as detailed above, including nonvisualization  of the left AICA. This could reflect very diminutive size, congenital  hypoplasia, or less likely chronic occlusion. Results from [place] encounter on 11/26/19    MRI IAC W WO CONT    Impression  IMPRESSION: Negative examination. No Known Allergies    Current Outpatient Medications   Medication Sig    venlafaxine-SR (EFFEXOR-XR) 75 mg capsule Take 1 Capsule by mouth daily.  warfarin (COUMADIN) 5 mg tablet Take 5 mg by mouth daily.  methocarbamoL (ROBAXIN) 500 mg tablet Take 1 Tab by mouth three (3) times daily as needed for Muscle Spasm(s).     mycophenolate mofetil (CELLCEPT) 250 mg capsule Take 1,000 mg by mouth daily.  tacrolimus (PROGRAF) 1 mg capsule Take 1 mg by mouth three (3) times daily.  predniSONE (DELTASONE) 5 mg tablet Take 5 mg by mouth daily.  meclizine (ANTIVERT) 12.5 mg tablet Take 25 mg by mouth as needed.  magnesium oxide 500 mg tab Take 500 mg by mouth daily.  triamcinolone (NASACORT AQ) 55 mcg nasal inhaler 2 Sprays as needed.  acetaminophen (TYLENOL) 500 mg tablet Take  by mouth every six (6) hours as needed for Pain. No current facility-administered medications for this visit. Past medical history/surgical history, family history, and social history have been reviewed for today's visit      ROS    A ten system review of constitutional, cardiovascular, respiratory, musculoskeletal, endocrine, skin, SHEENT, genitourinary, psychiatric and neurologic systems was obtained and is unremarkable except as mentioned under HPI          EXAMINATION:     Visit Vitals  BP (!) 132/92 (BP 1 Location: Left upper arm, BP Patient Position: Sitting, BP Cuff Size: Adult)   Pulse 74   Temp 98.3 °F (36.8 °C)   Resp 15   Wt 298 lb (135.2 kg)   SpO2 97%   BMI 36.27 kg/m²         General appearance: Patient is well-developed and well-nourished in no apparent distress and well groomed.     Psych/mental health:  Affect: Appropriate    PHQ  3 most recent PHQ Screens 1/11/2018   PHQ Not Done -   Little interest or pleasure in doing things Not at all   Feeling down, depressed, irritable, or hopeless Not at all   Total Score PHQ 2 0       HEENT:   Normocephalic  With evidence of trauma: No  Full range of motion head neck: Yes  Tenderness to palpation of the head neck region: No; but certainly increased tone over the left trapezius sternocleidomastoid muscle  Patient presents with normal tympanic membrane without significant problems in the ear canal on otoscopic exam bilaterally      Cardiovascular:     Extremities warm to touch: Yes  Extremity swelling: No  Discoloration: No  Evidence of PVD: No    Respiratory:   Dyspnea on exertion: No   Abnormal effort on casual observation: No   Use of portable oxygen: No   Evidence of cyanosis: No     Musculoskeletal:   Evidence of significant bone deformities: No   Spinal curvature: No     Integumentary:    Obvious bruising: No   Lacerations or discoloration on casual observation: No       Neurological Examination:   Mental Status:        MMSE  No flowsheet data found. Formal testing was not completed    there was nothing concerning on general observation and discussion. Alert oriented and appropriate to general conversation  Normal processing on general observation  Followed conversation and responded seemingly appropriate throughout the office visit  No word finding difficulties noted on casual observation  Able to follow directions without difficulty     Cranial Nerves:    Grossly intact specifically EOMs are full no nystagmus is appreciated    Motor:   Normal bulk  No tremor appreciated on today's exam  No abnormal movements appreciated on today's exam  Moves extremities spontaneously and with purpose      Sensation: Intact to light touch    Coordination/Cerebellar:   Grossly intact    Gait: Ambulates independently with normal gait and station    Reflexes: Not tested    Fall risk assessment  Fall Risk Assessment, last 12 mths 11/18/2020   Able to walk? Yes   Fall in past 12 months? No   Number of falls in past 12 months -   Fall with injury? -           Follow-up and Dispositions    · Return in about 3 months (around 9/15/2021) for Virtual visit.            Nemo Morales MS, ANP-BC, [facility]

## 2021-06-15 NOTE — PATIENT INSTRUCTIONS
As per discussion    We can increase the Effexor to a total of 75 mg/day. Right now since she just got a 90-day supply of the 37.5 mg tablets to take 2 at the same time but there will be a prescription waiting for you of the 75 mg to  when ready. When you  the new prescription remember to go back to taking 1 tablet/day    The increase in Effexor will hopefully be beneficial down regulating your headaches and migraines along with continuing to regulate the vertigo and may also help with some of the mood issues we discussed at today's office visit    Continue with Vish Veronica I gave you samples of 50 mg tablets  Unfortunately many the medications that we use for headache and migraine rescue are contraindicated related to your medications or medical conditions so our options are limited    If you need to get a hold of me my chart is the most efficient effective method of communication    I hope you have a great summer and happy for the July    Office Policies    o Phone calls/patient messages:  Please allow up to 24 hours for someone in the office to contact you about your call or message. Be mindful your provider may be out of the office or your message may require further review. We encourage you to use [employer] for your messages as this is a faster, more efficient way to communicate with our office    o Medication Refills:  Prescription medications require up to 48 business hours to process. We encourage you to use [employer] for your refills. For controlled medications: Please allow up to 72 business hours to process. Certain medications may require you to  a written prescription at our office. NO narcotic/controlled medications will be prescribed after 4pm Monday through Friday or on weekends    o Form/Paperwork Completion:  We ask that you allow 7-14 business days. You may also download your forms to [employer] to have your doctor print off.

## 2021-09-15 ENCOUNTER — VIRTUAL VISIT (OUTPATIENT)
Dept: NEUROLOGY | Age: 66
End: 2021-09-15
Payer: MEDICARE

## 2021-09-15 DIAGNOSIS — F33.9 RECURRENT DEPRESSION (HCC): ICD-10-CM

## 2021-09-15 DIAGNOSIS — M54.81 OCCIPITAL NEURALGIA OF LEFT SIDE: ICD-10-CM

## 2021-09-15 DIAGNOSIS — H81.02 MENIERE'S DISEASE OF LEFT EAR: ICD-10-CM

## 2021-09-15 DIAGNOSIS — G44.209 TENSION VASCULAR HEADACHE: ICD-10-CM

## 2021-09-15 DIAGNOSIS — Z79.899 POLYPHARMACY: ICD-10-CM

## 2021-09-15 DIAGNOSIS — M54.2 NECK PAIN: ICD-10-CM

## 2021-09-15 DIAGNOSIS — R42 VERTIGO: ICD-10-CM

## 2021-09-15 DIAGNOSIS — G44.86 CERVICOGENIC HEADACHE: ICD-10-CM

## 2021-09-15 DIAGNOSIS — R69 MULTIPLE COMORBID CONDITIONS: ICD-10-CM

## 2021-09-15 DIAGNOSIS — M47.812 CERVICAL SPINE ARTHRITIS: ICD-10-CM

## 2021-09-15 DIAGNOSIS — G43.109 MIGRAINE WITH VERTIGO: Primary | ICD-10-CM

## 2021-09-15 PROBLEM — F32.0 CURRENT MILD EPISODE OF MAJOR DEPRESSIVE DISORDER WITHOUT PRIOR EPISODE (HCC): Status: ACTIVE | Noted: 2021-09-15

## 2021-09-15 PROBLEM — G43.909 MIGRAINE: Status: ACTIVE | Noted: 2021-09-15

## 2021-09-15 PROBLEM — N28.9 KIDNEY DISORDER: Status: ACTIVE | Noted: 2020-12-15

## 2021-09-15 PROBLEM — E66.9 OBESITY (BMI 30.0-34.9): Status: ACTIVE | Noted: 2021-03-03

## 2021-09-15 PROCEDURE — G8417 CALC BMI ABV UP PARAM F/U: HCPCS | Performed by: PSYCHIATRY & NEUROLOGY

## 2021-09-15 PROCEDURE — G8536 NO DOC ELDER MAL SCRN: HCPCS | Performed by: PSYCHIATRY & NEUROLOGY

## 2021-09-15 PROCEDURE — 99214 OFFICE O/P EST MOD 30 MIN: CPT | Performed by: PSYCHIATRY & NEUROLOGY

## 2021-09-15 PROCEDURE — G8427 DOCREV CUR MEDS BY ELIG CLIN: HCPCS | Performed by: PSYCHIATRY & NEUROLOGY

## 2021-09-15 PROCEDURE — G9717 DOC PT DX DEP/BP F/U NT REQ: HCPCS | Performed by: PSYCHIATRY & NEUROLOGY

## 2021-09-15 PROCEDURE — 1101F PT FALLS ASSESS-DOCD LE1/YR: CPT | Performed by: PSYCHIATRY & NEUROLOGY

## 2021-09-15 PROCEDURE — 3017F COLORECTAL CA SCREEN DOC REV: CPT | Performed by: PSYCHIATRY & NEUROLOGY

## 2021-09-15 RX ORDER — PREDNISONE 2.5 MG/1
2.5 TABLET ORAL DAILY
COMMUNITY
Start: 2021-08-23 | End: 2022-03-15 | Stop reason: ALTCHOICE

## 2021-09-15 RX ORDER — VENLAFAXINE HYDROCHLORIDE 37.5 MG/1
37.5 CAPSULE, EXTENDED RELEASE ORAL DAILY
COMMUNITY
Start: 2021-04-06 | End: 2021-09-15 | Stop reason: DRUGHIGH

## 2021-09-15 RX ORDER — POLYETHYLENE GLYCOL-3350 AND ELECTROLYTES WITH FLAVOR PACK 240; 5.84; 2.98; 6.72; 22.72 G/278.26G; G/278.26G; G/278.26G; G/278.26G; G/278.26G
POWDER, FOR SOLUTION ORAL
COMMUNITY
Start: 2021-02-19 | End: 2021-09-15

## 2021-09-15 NOTE — ASSESSMENT & PLAN NOTE
Presently improved  May or may not be related to the Effexor nonetheless it certainly can be beneficial in this arena

## 2021-09-15 NOTE — ASSESSMENT & PLAN NOTE
Presently stable and doing well continue on Effexor  Continue activity as tolerated  Robaxin as needed

## 2021-09-15 NOTE — ASSESSMENT & PLAN NOTE
Secondary to cervical arthritis  Presently stable/improved  Patient is on Effexor for migraine and vertigo but may certainly be helping with the pain situation.   Continue on Robaxin  Activity as tolerated

## 2021-09-15 NOTE — PATIENT INSTRUCTIONS
As per discussion    You are doing well in multiple areas that is awesome  Continue on the venlafaxine also known as Effexor 75 mg/day  And yes it can give you some vivid dreams as I like to call them  I am glad you are out doing activities and enjoying yourself and your family    Continue to stay safe over the next months with Covid in the flu season coming up    If you need to get a hold of me my chart is most efficient method of communication but you are certainly welcome to call the office    Office Policies    o Phone calls/patient messages:  Please allow up to 24 hours for someone in the office to contact you about your call or message. Be mindful your provider may be out of the office or your message may require further review. We encourage you to use [employer] for your messages as this is a faster, more efficient way to communicate with our office    o Medication Refills:  Prescription medications require up to 48 business hours to process. We encourage you to use [employer] for your refills. For controlled medications: Please allow up to 72 business hours to process. Certain medications may require you to  a written prescription at our office. NO narcotic/controlled medications will be prescribed after 4pm Monday through Friday or on weekends    o Form/Paperwork Completion:  We ask that you allow 7-14 business days. You may also download your forms to [employer] to have your doctor print off.

## 2021-09-15 NOTE — PROGRESS NOTES
[address] 82  In Office FOLLOW-UP VISIT     Harjinder Cristobal is a 76 y.o. male who was seen by synchronous (real-time) audio-video technology on 9/15/2021. Harjinder Cristobal is a 76 y.o. male who presents today for the following:  Chief Complaint   Patient presents with    Follow-up    Migraine     much better on the 75mg of effexor. states that his migraines have resolved    Gait Problem     states this has resolved and no longer uses the meclizine         ASSESSMENT AND PLAN  Patient has mixed headache and migraine disorder    1. Migraine with vertigo  Assessment & Plan:    Stable on Effexor 75 mg daily   Mild side effects of vivid dreams    For rescue medicine we have prescribed Ubrelvy patient cannot afford it. Most of his other medications for rescue that would be used for headache and migraine syndrome are contraindicated due to other medical problems   But for now he has not needed any rescue medication    Previous visit to discuss headache and migraine regarding pathophysiology preventative and rescue treatment options  2. Occipital neuralgia of left side  Assessment & Plan:      Stable on Effexor 75 mg daily  He is also using Robaxin as needed  3. Vertigo  Assessment & Plan:      Stable on Effexor 75 mg daily  4. Meniere's disease of left ear  Assessment & Plan:      Has not needed to use meclizine  Still has reduced hearing  Overall stable at this time  5. Neck pain  Assessment & Plan:      Secondary to cervical arthritis  Presently stable/improved  Patient is on Effexor for migraine and vertigo but may certainly be helping with the pain situation. Continue on Robaxin  Activity as tolerated  6. Cervicogenic headache  Assessment & Plan:      Presently stable and doing well continue on Effexor  Continue activity as tolerated  Robaxin as needed  7. Tension vascular headache  Assessment & Plan:      Stable on Effexor 75 mg daily  8.  Cervical spine arthritis  Assessment & Plan:      Improved patient continues on Effexor 75 mg daily he also has Robaxin  And he uses chiropractic care as needed      9. Recurrent depression ([facility] [address]  Assessment & Plan:     Presently improved  May or may not be related to the Effexor nonetheless it certainly can be beneficial in this arena  10. Polypharmacy  11. Multiple comorbid conditions        Patient and/or family was given time to ask questions and voice concerns. I believe all questions concerns were adequately addressed at this  office visit. Patient and/or family also verbalized agreement and understanding of the above-stated plan    Patient remains a complex patient secondary to polypharmacy, significant comorbid conditions, and use of high-risk medications which complicate the decision making process related to patient's neurologic diagnosis      ICD-10-CM ICD-9-CM    1. Migraine with vertigo  G43.109 346.80      780.4    2. Occipital neuralgia of left side  M54.81 723.8    3. Vertigo  R42 780.4    4. Meniere's disease of left ear  H81.02 386.00    5. Neck pain  M54.2 723.1    6. Cervicogenic headache  R51.9 784.0    7. Tension vascular headache  G44.209 307.81    8. Cervical spine arthritis  M47.812 721.0    9. Recurrent depression (HCC)  F33.9 296.30    10. Polypharmacy  Z79.899 V58.69    11. Multiple comorbid conditions  R69 799.9          I attest that 40 minutes was spent on today's visit reviewing medical records and diagnostic testing deemed pertinent to this patient's care, along with direct time spent at patient's visit including the history, physical assessment and plan, discussing diagnosis and management along with documentation.       HPI  Historical Data  Patient is known to the practice and has been previously seen by Dr. Oxana Post     Neurologic diagnosis  Dizziness and vertigo             Date of onset 2007                        Diagnosed with Ménière's disease             Severe pressure sensation in her ear             Hearing loss on the left             Severe bouts of intermittent vertigo             Evaluated by 3 different ENT specialist             Steroid injections tried as recent as March 2020             MRI with attention to the IACs November 2019 was normal             Historically has used Antivert as needed             Hearing loss LT ear     Headaches migraines             Started at the base of the skull and radiates to the frontal region             Associated with increased dizziness and vertigo               Other associated symptoms blurred vision     Neck pain             Cervical arthritis             Sees a chiropractor             In the past has been seen by orthopedic as well; as far as we understand he is not a surgical candidate     Other significant comorbid conditions  Idiopathic cirrhosis             Subsequently developed liver cancer                        Status post liver transplant and is on chronic antirejection therapy  History of sarcoidosis  History of DVT             On chronic anticoagulation therapy     Patient has longstanding history of playing football                             Interim Data:     Here for follow-up regarding vertigo headaches and neck pain     Vertigo  Overall is been reasonably well controlled since starting low-dose Effexor 37.5 mg/day  He still feels as though there is some ear fullness on the left side  I was contacted by the patient he was having increased problems related to vertigo and we increased his dose to 75 mg  OV 9/15/2021 since on 75 mg his migraines and his vertigo have been essentially resolved   He does state he is having more vivid dreams on the higher dose   Also he got his prescription adjusted at about the same time that we increase his dose to 75 mg so is not sure if that had anything to do with it   For right now he is not interested in adjusting his prescription on the Effexor     Headaches migraines/ migraine with vertigo  OV 9/15/2021 since on Effexor 75 mg he is having no headaches or migraines   On lower dose Effexor he would still get headaches and migraines but can last up to 3 days that were much less frequent    Neck pain  Baseline for patient  Continues with chiropractic which he finds helpful             KKFINAO reports there is high velocity manipulations with chiropractic therapy  OV 9/15/2021 patient now reporting that has not needed to go to his chiropractor for at least 6 weeks which is good for him   He feels his neck pain is doing better       New complaint as of 6/15/2021  Patient states that he seems more withdrawn, lack of interest, more irritable mentions possibility of being more depressed  OV 9/15/2021: Patient feels as though mood is improved he does not think it is anything to do with the Effexor he still goes in and out related to being withdrawn but it is less frequent      Diagnostic testing  Carotid duplex studies completed 9/24/2020             Normal ICA with less than 15% stenosis             Vertebral artery: Right vertebral artery unable to be visualized thought most likely due to technical difficulty but cannot rule out obstruction or congenital cerebrovascular disease     CTA head neck completed October 27, 2020             Left dominant vertebral artery with mild hypoplastic right vertebral artery             Patent posterior inferior cerebellar arteries bilaterally             Patent AICA bilaterally      Results from Hospital Encounter encounter on 10/15/20    MRA BRAIN WO CONT    Impression  IMPRESSION:  Asymmetric basilar artery branches as detailed above, including nonvisualization  of the left AICA. This could reflect very diminutive size, congenital  hypoplasia, or less likely chronic occlusion. Results from [place] encounter on 11/26/19    MRI IAC W WO CONT    Impression  IMPRESSION: Negative examination.       No Known Allergies    Current Outpatient Medications   Medication Sig  predniSONE (DELTASONE) 2.5 mg tablet Take 2.5 mg by mouth daily.  venlafaxine-SR (EFFEXOR-XR) 75 mg capsule Take 1 Capsule by mouth daily.  warfarin (COUMADIN) 5 mg tablet Take 5 mg by mouth daily.  methocarbamoL (ROBAXIN) 500 mg tablet Take 1 Tab by mouth three (3) times daily as needed for Muscle Spasm(s).  mycophenolate mofetil (CELLCEPT) 250 mg capsule Take 500 mg by mouth two (2) times a day.  tacrolimus (PROGRAF) 1 mg capsule Take 1 mg by mouth two (2) times a day.  magnesium oxide 500 mg tab Take 500 mg by mouth daily.  triamcinolone (NASACORT AQ) 55 mcg nasal inhaler 2 Sprays as needed.  acetaminophen (TYLENOL) 500 mg tablet Take  by mouth every six (6) hours as needed for Pain.  predniSONE (DELTASONE) 5 mg tablet Take 5 mg by mouth daily. (Patient not taking: Reported on 9/15/2021)    meclizine (ANTIVERT) 12.5 mg tablet Take 25 mg by mouth as needed. (Patient not taking: Reported on 9/15/2021)     No current facility-administered medications for this visit. Past medical history/surgical history, family history, and social history have been reviewed for today's visit      ROS    A ten system review of constitutional, cardiovascular, respiratory, musculoskeletal, endocrine, skin, SHEENT, genitourinary, psychiatric and neurologic systems was obtained and is unremarkable except as mentioned under HPI          EXAMINATION: Within the limits of virtual visit in casual observation: Due to technical difficulties video portion was extremely limited    There were no vitals taken for this visit. General appearance: Patient is well-developed and well-nourished in no apparent distress and well groomed.     Psych/mental health:  Affect: Appropriate    PHQ  3 most recent PHQ Screens 1/11/2018   PHQ Not Done -   Little interest or pleasure in doing things Not at all   Feeling down, depressed, irritable, or hopeless Not at all   Total Score PHQ 2 0       HEENT:   Normocephalic  With evidence of trauma: No  Full range of motion head neck: Yes  Tenderness to palpation of the head neck region: Not tested due to being a virtual visit but in the past:  No; but certainly increased tone over the left trapezius sternocleidomastoid muscle  Patient presents with normal tympanic membrane without significant problems in the ear canal on otoscopic exam bilaterally      Cardiovascular:     Extremities warm to touch: N/A  Extremity swelling: No  Discoloration: No  Evidence of PVD: No    Respiratory:   Dyspnea on exertion: No   Abnormal effort on casual observation: No   Use of portable oxygen: No   Evidence of cyanosis: No     Musculoskeletal:   Evidence of significant bone deformities: No   Spinal curvature: No     Integumentary:    Obvious bruising: No   Lacerations or discoloration on casual observation: No       Neurological Examination:   Mental Status:        MMSE  No flowsheet data found. Formal testing was not completed    there was nothing concerning on general observation and discussion. Alert oriented and appropriate to general conversation  Normal processing on general observation  Followed conversation and responded seemingly appropriate throughout the office visit  No word finding difficulties noted on casual observation  Able to follow directions without difficulty     Cranial Nerves:    Her prior visit:  Grossly intact specifically EOMs are full no nystagmus is appreciated    Motor:   Per prior visit:  Normal bulk  No tremor appreciated on today's exam  No abnormal movements appreciated on today's exam  Moves extremities spontaneously and with purpose      Sensation: Not tested    Coordination/Cerebellar:   Grossly intact    Gait: Not tested    Reflexes: Not tested    Fall risk assessment  Fall Risk Assessment, last 12 mths 11/18/2020   Able to walk? Yes   Fall in past 12 months? No   Number of falls in past 12 months -   Fall with injury?  -         Due to this being a TeleHealth evaluation, many elements of the physical examination are unable to be assessed. Pursuant to the emergency declaration under the [facility], [facility] waiver authority and the [employer] Act, this Virtual  Visit was conducted, with patient's consent, to reduce the patient's risk of exposure to COVID-19 and provide continuity of care for an established patient. Services were provided through a video synchronous discussion virtually to substitute for in-person clinic visit. Follow-up and Dispositions    · Return in about 6 months (around 3/15/2022) for Virtual visit.            Jed Chambers MS, ANP-BC, [facility]

## 2021-09-15 NOTE — ASSESSMENT & PLAN NOTE
Improved patient continues on Effexor 75 mg daily he also has Robaxin  And he uses chiropractic care as needed

## 2021-09-15 NOTE — ASSESSMENT & PLAN NOTE
Stable on Effexor 75 mg daily   Mild side effects of vivid dreams    For rescue medicine we have prescribed Ubrelvy patient cannot afford it.   Most of his other medications for rescue that would be used for headache and migraine syndrome are contraindicated due to other medical problems   But for now he has not needed any rescue medication    Previous visit to discuss headache and migraine regarding pathophysiology preventative and rescue treatment options

## 2021-09-16 DIAGNOSIS — G44.209 TENSION VASCULAR HEADACHE: ICD-10-CM

## 2021-09-16 DIAGNOSIS — M54.81 OCCIPITAL NEURALGIA OF LEFT SIDE: ICD-10-CM

## 2021-09-16 DIAGNOSIS — G43.109 MIGRAINE WITH VERTIGO: ICD-10-CM

## 2021-09-16 DIAGNOSIS — G43.809 OTHER MIGRAINE WITHOUT STATUS MIGRAINOSUS, NOT INTRACTABLE: ICD-10-CM

## 2021-09-16 RX ORDER — VENLAFAXINE HYDROCHLORIDE 75 MG/1
75 CAPSULE, EXTENDED RELEASE ORAL DAILY
Qty: 90 CAPSULE | Refills: 1 | Status: SHIPPED | OUTPATIENT
Start: 2021-09-16 | End: 2022-03-15 | Stop reason: SDUPTHER

## 2022-03-15 ENCOUNTER — VIRTUAL VISIT (OUTPATIENT)
Dept: NEUROLOGY | Age: 67
End: 2022-03-15
Payer: MEDICARE

## 2022-03-15 DIAGNOSIS — G44.86 CERVICOGENIC HEADACHE: ICD-10-CM

## 2022-03-15 DIAGNOSIS — G43.001 MIGRAINE WITHOUT AURA AND WITH STATUS MIGRAINOSUS, NOT INTRACTABLE: ICD-10-CM

## 2022-03-15 DIAGNOSIS — B02.29 OTHER POSTHERPETIC NERVOUS SYSTEM INVOLVEMENT: ICD-10-CM

## 2022-03-15 DIAGNOSIS — M54.81 OCCIPITAL NEURALGIA OF LEFT SIDE: ICD-10-CM

## 2022-03-15 DIAGNOSIS — G43.809 OTHER MIGRAINE WITHOUT STATUS MIGRAINOSUS, NOT INTRACTABLE: ICD-10-CM

## 2022-03-15 DIAGNOSIS — G43.109 MIGRAINE WITH VERTIGO: Primary | ICD-10-CM

## 2022-03-15 DIAGNOSIS — G44.209 TENSION VASCULAR HEADACHE: ICD-10-CM

## 2022-03-15 DIAGNOSIS — Z79.899 POLYPHARMACY: ICD-10-CM

## 2022-03-15 DIAGNOSIS — R69 MULTIPLE COMORBID CONDITIONS: ICD-10-CM

## 2022-03-15 PROCEDURE — G8427 DOCREV CUR MEDS BY ELIG CLIN: HCPCS | Performed by: PSYCHIATRY & NEUROLOGY

## 2022-03-15 PROCEDURE — 99214 OFFICE O/P EST MOD 30 MIN: CPT | Performed by: PSYCHIATRY & NEUROLOGY

## 2022-03-15 PROCEDURE — G9717 DOC PT DX DEP/BP F/U NT REQ: HCPCS | Performed by: PSYCHIATRY & NEUROLOGY

## 2022-03-15 PROCEDURE — 3017F COLORECTAL CA SCREEN DOC REV: CPT | Performed by: PSYCHIATRY & NEUROLOGY

## 2022-03-15 PROCEDURE — G8536 NO DOC ELDER MAL SCRN: HCPCS | Performed by: PSYCHIATRY & NEUROLOGY

## 2022-03-15 PROCEDURE — 1101F PT FALLS ASSESS-DOCD LE1/YR: CPT | Performed by: PSYCHIATRY & NEUROLOGY

## 2022-03-15 PROCEDURE — G8417 CALC BMI ABV UP PARAM F/U: HCPCS | Performed by: PSYCHIATRY & NEUROLOGY

## 2022-03-15 RX ORDER — VENLAFAXINE HYDROCHLORIDE 75 MG/1
75 CAPSULE, EXTENDED RELEASE ORAL DAILY
Qty: 90 CAPSULE | Refills: 3 | Status: SHIPPED | OUTPATIENT
Start: 2022-03-15

## 2022-03-15 NOTE — ASSESSMENT & PLAN NOTE
Well-controlled continue on Effexor 75 mg daily  90-day supply and refills x1 year was sent to the pharmacy today    He has not needed to use any rescue medication

## 2022-03-15 NOTE — ASSESSMENT & PLAN NOTE
Continue on Effexor  Can use Robaxin as needed  Agree with continued exercises for neck and shoulder muscle release

## 2022-03-15 NOTE — PROGRESS NOTES
[facility] 82  In Office FOLLOW-UP VISIT     Pedro Zayas is a 78 y.o. male who was seen by synchronous (real-time) audio-video technology on 3/15/2022. Pedro Zayas is a 78 y.o. male who presents today for the following:  Chief Complaint   Patient presents with    Follow-up    Migraine         ASSESSMENT AND PLAN  Patient has mixed headache and migraine disorder    1. Migraine with vertigo  Assessment & Plan:   Well-controlled continue on Effexor 75 mg daily  90-day supply and refills x1 year was sent to the pharmacy today    He has not needed to use any rescue medication  Orders:  -     venlafaxine-SR (EFFEXOR-XR) 75 mg capsule; Take 1 Capsule by mouth daily. , Normal, Disp-90 Capsule, R-3ZERO refills remain on this prescription. Your patient is requesting advance approval of refills for this medication to [address]  2. Migraine without aura and with status migrainosus, not intractable  Assessment & Plan:   As under problem #1  3. Tension vascular headache  Assessment & Plan:   As under problem #1  Orders:  -     venlafaxine-SR (EFFEXOR-XR) 75 mg capsule; Take 1 Capsule by mouth daily. , Normal, Disp-90 Capsule, R-3ZERO refills remain on this prescription. Your patient is requesting advance approval of refills for this medication to [address]  4. Occipital neuralgia of left side  Assessment & Plan:   Continue on Effexor   Continue on Robaxin as needed  Orders:  -     venlafaxine-SR (EFFEXOR-XR) 75 mg capsule; Take 1 Capsule by mouth daily. , Normal, Disp-90 Capsule, R-3ZERO refills remain on this prescription. Your patient is requesting advance approval of refills for this medication to [address]  5. Cervicogenic headache  Assessment & Plan:   Continue on Effexor  Can use Robaxin as needed  Agree with continued exercises for neck and shoulder muscle release  6. Multiple comorbid conditions  7. Polypharmacy  8.  Other migraine without status migrainosus, not intractable  Assessment & Plan:   As under problem #1  Orders:  -     venlafaxine-SR (EFFEXOR-XR) 75 mg capsule; Take 1 Capsule by mouth daily. , Normal, Disp-90 Capsule, R-3ZERO refills remain on this prescription. Your patient is requesting advance approval of refills for this medication to [address]  9. Other postherpetic nervous system involvement  Assessment & Plan:   New onset end of February 2022  Has been managed through a different practice has completed steroids currently finishing Valtrex and has gabapentin to use as needed along with Tylenol [overall approved through his liver doctor]    Have encouraged the patient to continue with the Valtrex until gone and to use Tylenol and/or gabapentin as needed    We discussed other treatment options but for now I think Tylenol and/or gabapentin is appropriate        Patient and/or family was given time to ask questions and voice concerns. I believe all questions concerns were adequately addressed at this  office visit. Patient and/or family also verbalized agreement and understanding of the above-stated plan    Patient remains a complex patient secondary to polypharmacy, significant comorbid conditions, and use of high-risk medications which complicate the decision making process related to patient's neurologic diagnosis      ICD-10-CM ICD-9-CM    1. Migraine with vertigo  G43.109 346.80 venlafaxine-SR (EFFEXOR-XR) 75 mg capsule     780.4    2. Migraine without aura and with status migrainosus, not intractable  G43.001 346.12    3. Tension vascular headache  G44.209 307.81 venlafaxine-SR (EFFEXOR-XR) 75 mg capsule   4. Occipital neuralgia of left side  M54.81 723.8 venlafaxine-SR (EFFEXOR-XR) 75 mg capsule   5. Cervicogenic headache  G44.86 784.0    6. Multiple comorbid conditions  R69 799.9    7. Polypharmacy  Z79.899 V58.69    8.  Other migraine without status migrainosus, not intractable  G43.809 346.80 venlafaxine-SR (EFFEXOR-XR) 75 mg capsule   9.  Other postherpetic nervous system involvement  B02.29 053.19              HPI  Historical Data  Patient is known to the practice and has been previously seen by Dr. 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             Severe bouts of intermittent vertigo             Evaluated by 3 different ENT specialist             Steroid injections tried as recent as March 2020  Martha Funes with attention to the IACs November 2019 was normal             Historically has used Antivert as needed             Hearing loss LT ear     Headaches migraines             Started at the base of the skull and radiates to the frontal region             Associated with increased dizziness and vertigo               Other associated symptoms blurred vision     Neck pain             Cervical arthritis             Sees a chiropractor             In the past has been seen by orthopedic as well; as far as we understand he is not a surgical candidate     Other significant comorbid conditions  Idiopathic cirrhosis             Subsequently developed liver cancer                        Status post liver transplant and is on chronic antirejection therapy  History of sarcoidosis  History of DVT             On chronic anticoagulation therapy     Patient has longstanding history of playing football                             Interim Data:     Here for follow-up regarding vertigo headaches and neck pain     Vertigo  Overall is been reasonably well controlled since starting low-dose Effexor 37.5 mg/day  He still feels as though there is some ear fullness on the left side  I was contacted by the patient he was having increased problems related to vertigo and we increased his dose to 75 mg  OV 9/15/2021 since on 75 mg his migraines and his vertigo have been essentially resolved   He does state he is having more vivid dreams on the higher dose   Also he got his prescription adjusted at about the same time that we increase his dose to 75 mg so is not sure if that had anything to do with it   For right now he is not interested in adjusting his prescription on the Effexor     Headaches migraines/ migraine with vertigo  OV 9/15/2021 since on Effexor 75 mg he is having no headaches or migraines   On lower dose Effexor he would still get headaches and migraines but can last up to 3 days that were much less frequent    Neck pain  Baseline for patient  Continues with chiropractic which he finds helpful             [facility] is high velocity manipulations with chiropractic therapy  OV 9/15/2021 patient now reporting that has not needed to go to his chiropractor for at least 6 weeks which is good for him   He feels his neck pain is doing better  OV 3/15/22: doing exercises and stretching regularly and still playing golf       New complaint as of 6/15/2021  Patient states that he seems more withdrawn, lack of interest, more irritable mentions possibility of being more depressed  OV 9/15/2021: Patient feels as though mood is improved he does not think it is anything to do with the Effexor he still goes in and out related to being withdrawn but it is less frequent  [place] 3/20/2022: No complaints at today's office visit    Shingles:  New as of February 2022  Stinging and burning Left belt area   Steroids, Valtrex, tylenol gabapentin    Ha the Tylenol s some residual pain finishing up Valtrex no longer on steroids    For the most part does not feel as though he needs to be on regular rescue medicine and has not really use the gabapentin at all and occasionally uses      Diagnostic testing  Carotid duplex studies completed 9/24/2020             Normal ICA with less than 15% stenosis             Vertebral artery: Right vertebral artery unable to be visualized thought most likely due to technical difficulty but cannot rule out obstruction or congenital cerebrovascular disease     CTA head neck completed October 27, 2020             Left dominant vertebral artery with mild hypoplastic right vertebral artery             Patent posterior inferior cerebellar arteries bilaterally             Patent AICA bilaterally      Results from Hospital Encounter encounter on 10/15/20    MRA BRAIN WO CONT    Impression  IMPRESSION:  Asymmetric basilar artery branches as detailed above, including nonvisualization  of the left AICA. This could reflect very diminutive size, congenital  hypoplasia, or less likely chronic occlusion. Results from [place] encounter on 11/26/19    MRI IAC W WO CONT    Impression  IMPRESSION: Negative examination. No Known Allergies    Current Outpatient Medications   Medication Sig    venlafaxine-SR (EFFEXOR-XR) 75 mg capsule Take 1 Capsule by mouth daily.  warfarin (COUMADIN) 5 mg tablet Take 5 mg by mouth daily.  methocarbamoL (ROBAXIN) 500 mg tablet Take 1 Tab by mouth three (3) times daily as needed for Muscle Spasm(s).  mycophenolate mofetil (CELLCEPT) 250 mg capsule Take 500 mg by mouth two (2) times a day.  tacrolimus (PROGRAF) 1 mg capsule Take 1 mg by mouth two (2) times a day.  magnesium oxide 500 mg tab Take 500 mg by mouth daily.  triamcinolone (NASACORT AQ) 55 mcg nasal inhaler 2 Sprays as needed.  acetaminophen (TYLENOL) 500 mg tablet Take  by mouth every six (6) hours as needed for Pain.  meclizine (ANTIVERT) 12.5 mg tablet Take 25 mg by mouth as needed. (Patient not taking: Reported on 9/15/2021)     No current facility-administered medications for this visit.        Past medical history/surgical history, family history, and social history have been reviewed for today's visit      ROS    A ten system review of constitutional, cardiovascular, respiratory, musculoskeletal, endocrine, skin, SHEENT, genitourinary, psychiatric and neurologic systems was obtained and is unremarkable except as mentioned under HPI          EXAMINATION: Within the limits of virtual visit in casual observation: Due to technical difficulties video portion was extremely limited    There were no vitals taken for this visit. General appearance: Patient is well-developed and well-nourished in no apparent distress and well groomed. Psych/mental health:  Affect: Appropriate    PHQ  3 most recent PHQ Screens 3/14/2022   PHQ Not Done -   Little interest or pleasure in doing things Not at all   Feeling down, depressed, irritable, or hopeless Several days   Total Score PHQ 2 1       HEENT:   Normocephalic  With evidence of trauma: No  Full range of motion head neck: Yes  Tenderness to palpation of the head neck region: Not tested due to being a virtual visit but in the past:  No; but certainly increased tone over the left trapezius sternocleidomastoid muscle  Patient presents with normal tympanic membrane without significant problems in the ear canal on otoscopic exam bilaterally      Cardiovascular:     Extremities warm to touch: N/A  Extremity swelling: No  Discoloration: No  Evidence of PVD: No    Respiratory:   Dyspnea on exertion: No   Abnormal effort on casual observation: No   Use of portable oxygen: No   Evidence of cyanosis: No     Musculoskeletal:   Evidence of significant bone deformities: No   Spinal curvature: No     Integumentary:    Obvious bruising: No   Lacerations or discoloration on casual observation: No       Neurological Examination:   Mental Status:        MMSE  No flowsheet data found. Formal testing was not completed    there was nothing concerning on general observation and discussion.    Alert oriented and appropriate to general conversation  Normal processing on general observation  Followed conversation and responded seemingly appropriate throughout the office visit  No word finding difficulties noted on casual observation  Able to follow directions without difficulty     Cranial Nerves:    Her prior visit:  Grossly intact specifically EOMs are full no nystagmus is appreciated    Motor:   Per prior visit:  Normal bulk  No tremor appreciated on today's exam  No abnormal movements appreciated on today's exam  Moves extremities spontaneously and with purpose      Sensation: Not tested    Coordination/Cerebellar:   Grossly intact    Gait: Not tested    Reflexes: Not tested    Fall risk assessment  Fall Risk Assessment, last 12 mths 3/14/2022   Able to walk? Yes   Fall in past 12 months? 0   Do you feel unsteady? 0   Are you worried about falling 0   Number of falls in past 12 months -   Fall with injury? -         Due to this being a TeleHealth evaluation, many elements of the physical examination are unable to be assessed. Pursuant to the emergency declaration under the [address] [place] [address], Critical access hospital[facility] waiver authority and the [employer] and [employer] Act, this Virtual  Visit was conducted, with patient's consent, to reduce the patient's risk of exposure to COVID-19 and provide continuity of care for an established patient. Services were provided through a video synchronous discussion virtually to substitute for in-person clinic visit. Follow-up and Dispositions    · Return in about 1 year (around 3/15/2023) for Virtual visit.            Chidi Beatty MS, ANP-BC, [facility]

## 2022-03-15 NOTE — ASSESSMENT & PLAN NOTE
New onset end of February 2022  Has been managed through a different practice has completed steroids currently finishing Valtrex and has gabapentin to use as needed along with Tylenol [overall approved through his liver doctor]    Have encouraged the patient to continue with the Valtrex until gone and to use Tylenol and/or gabapentin as needed    We discussed other treatment options but for now I think Tylenol and/or gabapentin is appropriate

## 2022-03-18 PROBLEM — G43.109 MIGRAINE WITH VERTIGO: Status: ACTIVE | Noted: 2021-09-15

## 2022-03-18 PROBLEM — K74.60 CIRRHOSIS OF LIVER WITHOUT ASCITES (HCC): Status: ACTIVE | Noted: 2019-01-09

## 2022-03-18 PROBLEM — G44.86 CERVICOGENIC HEADACHE: Status: ACTIVE | Noted: 2020-09-18

## 2022-03-18 PROBLEM — M47.812 CERVICAL SPINE ARTHRITIS: Status: ACTIVE | Noted: 2021-09-15

## 2022-03-18 PROBLEM — G43.909 MIGRAINE: Status: ACTIVE | Noted: 2021-09-15

## 2022-03-18 PROBLEM — N18.31 STAGE 3A CHRONIC KIDNEY DISEASE (HCC): Status: ACTIVE | Noted: 2021-02-16

## 2022-03-18 PROBLEM — E66.9 OBESITY (BMI 30.0-34.9): Status: ACTIVE | Noted: 2021-03-03

## 2022-03-18 PROBLEM — E03.4 HYPOTHYROIDISM DUE TO ACQUIRED ATROPHY OF THYROID: Status: ACTIVE | Noted: 2020-09-18

## 2022-03-18 PROBLEM — G44.209 TENSION VASCULAR HEADACHE: Status: ACTIVE | Noted: 2021-09-15

## 2022-03-18 PROBLEM — M54.81 OCCIPITAL NEURALGIA OF LEFT SIDE: Status: ACTIVE | Noted: 2021-09-15

## 2022-03-19 PROBLEM — G45.0 VERTEBROBASILAR ARTERY INSUFFICIENCY: Status: ACTIVE | Noted: 2020-09-18

## 2022-03-19 PROBLEM — H81.03 MENIERE'S DISEASE OF BOTH EARS: Status: ACTIVE | Noted: 2018-07-09

## 2022-03-19 PROBLEM — D86.0 SARCOIDOSIS, LUNG (HCC): Status: ACTIVE | Noted: 2018-07-09

## 2022-03-19 PROBLEM — R42 VERTIGO: Status: ACTIVE | Noted: 2020-09-18

## 2022-03-19 PROBLEM — F33.9 RECURRENT DEPRESSION (HCC): Status: ACTIVE | Noted: 2018-01-11

## 2022-03-19 PROBLEM — M54.2 NECK PAIN: Status: ACTIVE | Noted: 2021-09-15

## 2022-03-19 PROBLEM — Z79.01 CHRONIC ANTICOAGULATION: Status: ACTIVE | Noted: 2021-02-16

## 2022-03-19 PROBLEM — C22.9 CANCER OF LIVER (HCC): Status: ACTIVE | Noted: 2019-01-16

## 2022-03-19 PROBLEM — R42 CERVICAL VERTIGO: Status: ACTIVE | Noted: 2020-09-18

## 2022-03-20 PROBLEM — R74.8 ELEVATED LIVER ENZYMES: Status: ACTIVE | Noted: 2018-01-11

## 2022-03-20 PROBLEM — N28.9 KIDNEY DISORDER: Status: ACTIVE | Noted: 2020-12-15

## 2022-03-20 PROBLEM — Z94.4 LIVER TRANSPLANTED (HCC): Status: ACTIVE | Noted: 2021-02-16

## 2022-03-24 PROBLEM — B02.29 OTHER POSTHERPETIC NERVOUS SYSTEM INVOLVEMENT: Status: ACTIVE | Noted: 2022-03-15

## 2023-02-07 ENCOUNTER — VIRTUAL VISIT (OUTPATIENT)
Dept: NEUROLOGY | Age: 68
End: 2023-02-07
Payer: MEDICARE

## 2023-02-07 ENCOUNTER — TELEPHONE (OUTPATIENT)
Dept: NEUROLOGY | Age: 68
End: 2023-02-07

## 2023-02-07 DIAGNOSIS — M54.81 OCCIPITAL NEURALGIA OF LEFT SIDE: ICD-10-CM

## 2023-02-07 DIAGNOSIS — H81.02 MENIERE'S DISEASE OF LEFT EAR: ICD-10-CM

## 2023-02-07 DIAGNOSIS — R42 VERTIGO: Primary | ICD-10-CM

## 2023-02-07 DIAGNOSIS — G43.809 OTHER MIGRAINE WITHOUT STATUS MIGRAINOSUS, NOT INTRACTABLE: ICD-10-CM

## 2023-02-07 DIAGNOSIS — G43.109 MIGRAINE WITH VERTIGO: ICD-10-CM

## 2023-02-07 DIAGNOSIS — G44.209 TENSION VASCULAR HEADACHE: ICD-10-CM

## 2023-02-07 PROCEDURE — G8427 DOCREV CUR MEDS BY ELIG CLIN: HCPCS | Performed by: PSYCHIATRY & NEUROLOGY

## 2023-02-07 PROCEDURE — G9717 DOC PT DX DEP/BP F/U NT REQ: HCPCS | Performed by: PSYCHIATRY & NEUROLOGY

## 2023-02-07 PROCEDURE — 3017F COLORECTAL CA SCREEN DOC REV: CPT | Performed by: PSYCHIATRY & NEUROLOGY

## 2023-02-07 PROCEDURE — G8536 NO DOC ELDER MAL SCRN: HCPCS | Performed by: PSYCHIATRY & NEUROLOGY

## 2023-02-07 PROCEDURE — 1123F ACP DISCUSS/DSCN MKR DOCD: CPT | Performed by: PSYCHIATRY & NEUROLOGY

## 2023-02-07 PROCEDURE — G8421 BMI NOT CALCULATED: HCPCS | Performed by: PSYCHIATRY & NEUROLOGY

## 2023-02-07 PROCEDURE — 99215 OFFICE O/P EST HI 40 MIN: CPT | Performed by: PSYCHIATRY & NEUROLOGY

## 2023-02-07 PROCEDURE — 1101F PT FALLS ASSESS-DOCD LE1/YR: CPT | Performed by: PSYCHIATRY & NEUROLOGY

## 2023-02-07 RX ORDER — TIZANIDINE 2 MG/1
2 TABLET ORAL
COMMUNITY
Start: 2022-09-01

## 2023-02-07 RX ORDER — ROSUVASTATIN CALCIUM 20 MG/1
20 TABLET, COATED ORAL DAILY
COMMUNITY
Start: 2022-05-26 | End: 2023-05-26

## 2023-02-07 RX ORDER — MECLIZINE HCL 12.5 MG 12.5 MG/1
TABLET ORAL
Qty: 180 TABLET | Refills: 1 | Status: SHIPPED | OUTPATIENT
Start: 2023-02-07

## 2023-02-07 RX ORDER — VENLAFAXINE HYDROCHLORIDE 75 MG/1
75 CAPSULE, EXTENDED RELEASE ORAL DAILY
Qty: 90 CAPSULE | Refills: 3 | Status: SHIPPED | OUTPATIENT
Start: 2023-02-07

## 2023-02-07 NOTE — PROGRESS NOTES
[facility]-UP [facility]ISIT     Cheryl Guerrero is a 75 y.o. male who was seen by synchronous (real-time) audio-video technology on 2/7/2023. Cheryl Guerrero is a 75 y.o. male who presents today for the following:  Chief Complaint   Patient presents with    Follow-up     Patient states in the last two weeks it is doing better and states that he has been doing something to relieve deep muscle and it is relieving the vertigo  and stress in the back. Takes a medication for liver transplant and cannot take medications that would interfere with that medication. ASSESSMENT AND PLAN  Patient has mixed headache and migraine disorder    1. Vertigo  Assessment & Plan:   Generally stable on Effexor 75 mg/day  Continue with chiropractic and has been getting some benefit  We will add physical therapy for vestibular intervention  Meclizine renewed 12.5 mg 1 or 2 tablets up to 3 times a day as needed  If does not improve can consider sending to ENT Dr. Yessi Epps who specializes in vertigo patient does have a remote history of Ménière's which may be contributing  Orders:  -     meclizine (ANTIVERT) 12.5 mg tablet; 1-2 tabs TID prn vertigo dizzy  Indications: sensation of spinning or whirling, Normal, Disp-180 Tablet, R-1  -     REFERRAL TO PHYSICAL THERAPY  2. Meniere's disease of left ear  -     meclizine (ANTIVERT) 12.5 mg tablet; 1-2 tabs TID prn vertigo dizzy  Indications: sensation of spinning or whirling, Normal, Disp-180 Tablet, R-1  3. Migraine with vertigo  Assessment & Plan:   Somewhat increased  We will continue venlafaxine 75 mg daily    Patient seems to be getting some relief from chiropractic he is to continue with that at this time  Orders:  -     venlafaxine-SR (EFFEXOR-XR) 75 mg capsule; Take 1 Capsule by mouth daily. , Normal, Disp-90 Capsule, R-3ZERO refills remain on this prescription.  Your patient is requesting advance approval of refills for this medication to PREVENT ANY MISSED DOSES  -     REFERRAL TO PHYSICAL THERAPY  4. Tension vascular headache  -     venlafaxine-SR (EFFEXOR-XR) 75 mg capsule; Take 1 Capsule by mouth daily. , Normal, Disp-90 Capsule, R-3ZERO refills remain on this prescription. Your patient is requesting advance approval of refills for this medication to [address] [employer]  5. Other migraine without status migrainosus, not intractable  -     venlafaxine-SR (EFFEXOR-XR) 75 mg capsule; Take 1 Capsule by mouth daily. , Normal, Disp-90 Capsule, R-3ZERO refills remain on this prescription. Your patient is requesting advance approval of refills for this medication to [address] [employer]  6. Occipital neuralgia of left side  Assessment & Plan:   Well-controlled since on Effexor 75 mg daily can continue to use Robaxin as needed  Orders:  -     venlafaxine-SR (EFFEXOR-XR) 75 mg capsule; Take 1 Capsule by mouth daily. , Normal, Disp-90 Capsule, R-3ZERO refills remain on this prescription. Your patient is requesting advance approval of refills for this medication to [address] [employer]        Patient and/or family was given time to ask questions and voice concerns. I believe all questions concerns were adequately addressed at this  office visit. Patient and/or family also verbalized agreement and understanding of the above-stated plan    Complex neurologic decision making secondary any or all of the following to include unclear etiology, and /or polypharmacy, and/or significant comorbid conditions, and/or use of high-risk medications which complicate the decision making process related to patient's neurologic diagnosis      ICD-10-CM ICD-9-CM    1. Vertigo  R42 780.4 meclizine (ANTIVERT) 12.5 mg tablet      REFERRAL TO PHYSICAL THERAPY      2. Meniere's disease of left ear  H81.02 386.00 meclizine (ANTIVERT) 12.5 mg tablet      3.  Migraine with vertigo  G43.109 346.80 venlafaxine-SR (EFFEXOR-XR) 75 mg capsule     780.4 REFERRAL TO PHYSICAL THERAPY 4. Tension vascular headache  G44.209 307.81 venlafaxine-SR (EFFEXOR-XR) 75 mg capsule      5. Other migraine without status migrainosus, not intractable  G43.809 346.80 venlafaxine-SR (EFFEXOR-XR) 75 mg capsule      6. Occipital neuralgia of left side  M54.81 723.8 venlafaxine-SR (EFFEXOR-XR) 75 mg capsule          I attest that 40 minutes was spent on today's visit reviewing medical records and diagnostic testing deemed pertinent to this patient's care, along with direct time spent at patient's visit including the history, physical assessment and plan, discussing diagnosis and management along with documentation.           HPI  Historical Data  Patient is known to the practice and has been previously seen by Dr. Britney Lee     Neurologic diagnosis  Dizziness and vertigo             Date of onset 2007                        Diagnosed with Ménière's disease             Severe pressure sensation in her ear             Hearing loss on the left             Severe bouts of intermittent vertigo             Evaluated by 3 different ENT specialist             Steroid injections tried as recent as March 2020             MRI with attention to the IACs November 2019 was normal             Historically has used Antivert as needed             Hearing loss LT ear     Headaches migraines             Started at the base of the skull and radiates to the frontal region             Associated with increased dizziness and vertigo               Other associated symptoms blurred vision     Neck pain             Cervical arthritis             Sees a chiropractor             In the past has been seen by orthopedic as well; as far as we understand he is not a surgical candidate     Other significant comorbid conditions  Idiopathic cirrhosis             Subsequently developed liver cancer                        Status post liver transplant and is on chronic antirejection therapy  History of sarcoidosis  History of DVT             On chronic anticoagulation therapy    History of shingles February 2022 with tingling and burning left belt area   Treatment:Steroids, Valtrex, tylenol gabapentin                               Interim Data:     Here for follow-up regarding vertigo headaches and neck pain     Vertigo  Presently continues on venlafaxine 75 mg/day  Over the past 6 months been having slow escalation of vertigo  This was also noted on prior office visit but did not want to increase medications  He is now having events at least once a month that is putting him in bed as sleeping and off in a dark quiet room seems to work best which might be more consistent with migraine  Patient is now seeing a different chiropractic and feels as though this has been beneficial and since he has been seeing this new chiropractic he has not had an event in at least 2 weeks     Headaches migraines/ migraine with vertigo  Since on Effexor 75 mg/day he has not had any migrainous head pain but more recently has been having more vertigo events which at least once a month puts him in bed and the only thing that seems to relieve it is taking a nap in the dark quiet room    Neck pain  More recently has changed to a different chiropractic and finding it more beneficial  Patient continues to be active playing golf 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 as well as other activities  He does feel at times he overdoes it which can aggravate his symptoms including vertigo and Now my o'clock is ready         Shingles:  New as of February 2022  Denies any issues related to postherpetic neuralgia      Diagnostic testing  Carotid duplex studies completed 9/24/2020             Normal ICA with less than 15% stenosis             Vertebral artery: Right vertebral artery unable to be visualized thought most likely due to technical difficulty but cannot rule out obstruction or congenital cerebrovascular disease     CTA head neck completed October 27, 2020             Left dominant vertebral artery with mild hypoplastic right vertebral artery             Patent posterior inferior cerebellar arteries bilaterally             Patent AICA bilaterally      Results from Hospital Encounter encounter on 10/15/20    MRA BRAIN WO CONT    Impression  IMPRESSION:  Asymmetric basilar artery branches as detailed above, including nonvisualization  of the left AICA. This could reflect very diminutive size, congenital  hypoplasia, or less likely chronic occlusion. Results from [place] encounter on 11/26/19    MRI IAC W WO CONT    Impression  IMPRESSION: Negative examination. No Known Allergies    Current Outpatient Medications   Medication Sig    tiZANidine (ZANAFLEX) 2 mg tablet Take 2 mg by mouth every six (6) hours as needed. rosuvastatin (CRESTOR) 20 mg tablet Take 20 mg by mouth daily. meclizine (ANTIVERT) 12.5 mg tablet 1-2 tabs TID prn vertigo dizzy  Indications: sensation of spinning or whirling    venlafaxine-SR (EFFEXOR-XR) 75 mg capsule Take 1 Capsule by mouth daily. warfarin (COUMADIN) 5 mg tablet Take 5 mg by mouth daily. methocarbamoL (ROBAXIN) 500 mg tablet Take 1 Tab by mouth three (3) times daily as needed for Muscle Spasm(s). tacrolimus (PROGRAF) 1 mg capsule Take 1 mg by mouth two (2) times a day. magnesium oxide 500 mg tab Take 500 mg by mouth daily. triamcinolone (NASACORT AQ) 55 mcg nasal inhaler 2 Sprays as needed. acetaminophen (TYLENOL) 500 mg tablet Take  by mouth every six (6) hours as needed for Pain. No current facility-administered medications for this visit.        Past medical history/surgical history, family history, and social history have been reviewed for today's visit      ROS    A ten system review of constitutional, cardiovascular, respiratory, musculoskeletal, endocrine, skin, SHEENT, genitourinary, psychiatric and neurologic systems was obtained and is unremarkable except as mentioned under HPI          EXAMINATION: Within the limits of virtual visit:  There were no vitals taken for this visit. General appearance: Patient is well-developed and well-nourished in no apparent distress and well groomed. Psych/mental health:  Affect: Appropriate    PHQ  3 most recent PHQ Screens 2/7/2023   PHQ Not Done -   Little interest or pleasure in doing things Not at all   Feeling down, depressed, irritable, or hopeless Not at all   Total Score PHQ 2 0       HEENT:   Normocephalic  With evidence of trauma: No  Full range of motion head neck: Yes  Tenderness to palpation of the head neck region: Not tested due to being a virtual visit but in the past:  No; but certainly increased tone over the left trapezius sternocleidomastoid muscle  Patient presents with normal tympanic membrane without significant problems in the ear canal on otoscopic exam bilaterally      Cardiovascular:     Extremities warm to touch: N/A  Extremity swelling: No  Discoloration: No  Evidence of PVD: No    Respiratory:   Dyspnea on exertion: No   Abnormal effort on casual observation: No   Use of portable oxygen: No   Evidence of cyanosis: No     Musculoskeletal:   Evidence of significant bone deformities: No   Spinal curvature: No     Integumentary:    Obvious bruising: No   Lacerations or discoloration on casual observation: No       Neurological Examination:   Mental Status:        MMSE  No flowsheet data found. Formal testing was not completed    there was nothing concerning on general observation and discussion.    Alert oriented and appropriate to general conversation  Normal processing on general observation  Followed conversation and responded seemingly appropriate throughout the office visit  No word finding difficulties noted on casual observation  Able to follow directions without difficulty     Cranial Nerves:    Her prior visit:  Grossly intact specifically EOMs are full no nystagmus is appreciated    Motor:   Per prior visit:  Normal bulk  No tremor appreciated on today's exam  No abnormal movements appreciated on today's exam  Moves extremities spontaneously and with purpose      Sensation: Not tested    Coordination/Cerebellar:   Grossly intact    Gait: Not tested    Reflexes: Not tested    Fall risk assessment  Fall Risk Assessment, last 12 mths 2/7/2023   Able to walk? Yes   Fall in past 12 months? 0   Do you feel unsteady? 0   Are you worried about falling 0   Number of falls in past 12 months -   Fall with injury? -         Due to this being a TeleHealth evaluation, many elements of the physical examination are unable to be assessed. Pursuant to the emergency declaration under the [facility], [facility] waiver authority and the [employer] and [employer] Act, this Virtual  Visit was conducted, with patient's consent, to reduce the patient's risk of exposure to COVID-19 and provide continuity of care for an established patient. Services were provided through a video synchronous discussion virtually to substitute for in-person clinic visit. Follow-up and Dispositions    Return in about 6 months (around 8/7/2023) for Virtual visit.              Polly Arteaga MS, ANP-BC, [facility]

## 2023-02-07 NOTE — PATIENT INSTRUCTIONS
As per discussion overall I think you are still doing reasonably well there is been an uptick in the vertigo  I agree with you continue with chiropractic  I referred you to physical therapy for vertigo desensitization you can take that to any physical therapy group in your region  I have also started you back on meclizine 12.5 mg you can take 1 or 2 tablets up to 3 times a day as needed  Continue on the venlafaxine 75 mg/day and I sent a new prescription in for that as well    As we talked about work on pacing yourself but I am glad you remain active and are doing well      Office Policies      Appointments  Please make sure that you arrive for your next appointment at least 15 minutes prior to your appointment time. If for some reason you are going to be late please notify the office to determine if you need to be rescheduled or we can adjust your appointment time      Phone calls/patient messages:  Please allow up to 24 hours for someone in the office to contact you about your call or message. Be mindful your provider may be out of the office or your message may require further review. We encourage you to use [employer] for your messages as this is a faster, more efficient way to communicate with our office    Medication Refills:  Prescription medications require up to 48 business hours to process. We encourage you to use [employer] for your refills. For controlled medications: Please allow up to 72 business hours to process. Certain medications may require you to  a written prescription at our office. NO narcotic/controlled medications will be prescribed after 4pm Monday through Friday or on weekends    Form/Paperwork Completion:  We ask that you allow 7-14 business days. You may also download your forms to [employer] to have your doctor print off.

## 2023-02-07 NOTE — TELEPHONE ENCOUNTER
VOICEMAIL      Pt would like to speak to [facility] nurse about his VV today. States he needs to talk about some issues and get a fu appt.  Please call [phone]

## 2023-02-07 NOTE — ASSESSMENT & PLAN NOTE
Somewhat increased  We will continue venlafaxine 75 mg daily    Patient seems to be getting some relief from chiropractic he is to continue with that at this time

## 2023-02-07 NOTE — ASSESSMENT & PLAN NOTE
Generally stable on Effexor 75 mg/day  Continue with chiropractic and has been getting some benefit  We will add physical therapy for vestibular intervention  Meclizine renewed 12.5 mg 1 or 2 tablets up to 3 times a day as needed  If does not improve can consider sending to ENT Dr. Susy Murphy who specializes in vertigo patient does have a remote history of Ménière's which may be contributing

## 2023-02-17 DIAGNOSIS — M25.562 LEFT KNEE PAIN, UNSPECIFIED CHRONICITY: Primary | ICD-10-CM

## 2023-02-22 ENCOUNTER — OFFICE VISIT (OUTPATIENT)
Dept: ORTHOPEDIC SURGERY | Age: 68
End: 2023-02-22
Payer: MEDICARE

## 2023-02-22 ENCOUNTER — HOSPITAL ENCOUNTER (OUTPATIENT)
Dept: GENERAL RADIOLOGY | Age: 68
Discharge: HOME | End: 2023-02-22
Payer: MEDICARE

## 2023-02-22 VITALS
WEIGHT: 301 LBS | HEIGHT: 76 IN | RESPIRATION RATE: 18 BRPM | DIASTOLIC BLOOD PRESSURE: 86 MMHG | HEART RATE: 78 BPM | SYSTOLIC BLOOD PRESSURE: 142 MMHG | OXYGEN SATURATION: 98 % | TEMPERATURE: 97 F | BODY MASS INDEX: 36.65 KG/M2

## 2023-02-22 DIAGNOSIS — M25.562 CHRONIC PAIN OF LEFT KNEE: Primary | ICD-10-CM

## 2023-02-22 DIAGNOSIS — G89.29 CHRONIC PAIN OF LEFT KNEE: Primary | ICD-10-CM

## 2023-02-22 DIAGNOSIS — M25.562 LEFT KNEE PAIN, UNSPECIFIED CHRONICITY: ICD-10-CM

## 2023-02-22 PROCEDURE — 73564 X-RAY EXAM KNEE 4 OR MORE: CPT

## 2023-02-22 RX ORDER — BETAMETHASONE SODIUM PHOSPHATE AND BETAMETHASONE ACETATE 3; 3 MG/ML; MG/ML
6 INJECTION, SUSPENSION INTRA-ARTICULAR; INTRALESIONAL; INTRAMUSCULAR; SOFT TISSUE ONCE
Status: COMPLETED | OUTPATIENT
Start: 2023-02-22 | End: 2023-02-22

## 2023-02-22 RX ADMIN — BETAMETHASONE SODIUM PHOSPHATE AND BETAMETHASONE ACETATE 6 MG: 3; 3 INJECTION, SUSPENSION INTRA-ARTICULAR; INTRALESIONAL; INTRAMUSCULAR; SOFT TISSUE at 11:18

## 2023-02-22 NOTE — PROGRESS NOTES
2/22/2023      CC: Left knee pain    HPI:      This is a 75y.o. year old patient who complains of left knee pain, this is on the medial aspect of the knee, it has been present for a year, though he's been asymptomatic for a 9 month stretch. Onset was after a basketball game three weeks ago. This pain is activity dependent, it causes a significant amount of difficulty with athletic activities and stairs. The patient does not complain of  mechanical symptoms and clicking within the knee. PMH:  Past Medical History:   Diagnosis Date    Cancer [facility])     Liver cancer    Cancer of liver ([facility] [address] 1/16/2019    Cervical vertigo 9/18/2020    Cervicogenic headache 9/18/2020    Depression     Elevated liver enzymes 1/11/2018    Headache     History of pulmonary embolism 6/21/2016    Hypothyroidism due to acquired atrophy of thyroid 9/18/2020    Meniere disease 6/21/2016    Recurrent depression ([facility] [address] 1/11/2018    Sarcoid     Full recovery    Sarcoidosis, lung ([facility] [address] 7/9/2018    Stage 3a chronic kidney disease ([facility] [address] 2/16/2021    Vertebrobasilar artery insufficiency 9/18/2020    Vertigo 9/18/2020       PSxHx:  Past Surgical History:   Procedure Laterality Date    HX ACL RECONSTRUCTION  1997    HX CHOLECYSTECTOMY  2006    HX COLONOSCOPY  1/1/2013    HX LIVER TRANSPLANT      HX TONSIL AND ADENOIDECTOMY         Meds:    Current Outpatient Medications:     tiZANidine (ZANAFLEX) 2 mg tablet, Take 2 mg by mouth every six (6) hours as needed. , Disp: , Rfl:     rosuvastatin (CRESTOR) 20 mg tablet, Take 20 mg by mouth daily. , Disp: , Rfl:     meclizine (ANTIVERT) 12.5 mg tablet, 1-2 tabs TID prn vertigo dizzy  Indications: sensation of spinning or whirling, Disp: 180 Tablet, Rfl: 1    venlafaxine-SR (EFFEXOR-XR) 75 mg capsule, Take 1 Capsule by mouth daily. , Disp: 90 Capsule, Rfl: 3    warfarin (COUMADIN) 5 mg tablet, Take 5 mg by mouth daily. , Disp: , Rfl:     methocarbamoL (ROBAXIN) 500 mg tablet, Take 1 Tab by mouth three (3) times daily as needed for Muscle Spasm(s). , Disp: 100 Tab, Rfl: 5    tacrolimus (PROGRAF) 1 mg capsule, Take 1 mg by mouth two (2) times a day., Disp: , Rfl:     magnesium oxide 500 mg tab, Take 500 mg by mouth daily. , Disp: , Rfl:     triamcinolone (NASACORT AQ) 55 mcg nasal inhaler, 2 Sprays as needed. , Disp: , Rfl:     acetaminophen (TYLENOL) 500 mg tablet, Take  by mouth every six (6) hours as needed for Pain., Disp: , Rfl:   No current facility-administered medications for this visit.     All:  No Known Allergies    Social Hx:  Social History     Socioeconomic History    Marital status:     Number of children: 3   Occupational History    Occupation: retired   Tobacco Use    Smoking status: Former     Packs/day: 0.25     Years: 15.00     Pack years: 3.75     Types: Cigars, Cigarettes     Quit date: 2016     Years since quittin.2    Smokeless tobacco: Never    Tobacco comments:     Still smokes Cigars   Vaping Use    Vaping Use: Never used   Substance and Sexual Activity    Alcohol use: No     Alcohol/week: 0.0 standard drinks    Drug use: No    Sexual activity: Yes     Partners: Female   Social History Narrative    Lives with wife        Family Hx:  Family History   Problem Relation Age of Onset    Diabetes Mother     Stroke Mother     Dementia Mother     Migraines Mother     Heart Disease Mother     Neuropathy Mother     Cancer Father         lung    COPD Father          Review of Systems:       General: Denies headache, lethargy, fever, weight loss  Ears/Nose/Throat: Denies ear discharge, drainage, nosebleeds, hoarse voice, dental problems  Cardiovascular: Denies chest pain, shortness of breath  Lungs: Denies chest pain, breathing problems, wheezing, pneumonia  Stomach: Denies stomach pain, heartburn, constipation, irritable bowel  Skin: Denies rash, sores, open wounds  Musculoskeletal: left knee pain  Genitourinary: Denies dysuria, hematuria, polyuria  Gastrointestinal: Denies constipation, obstipation, diarrhea  Neurological: Denies changes in sight, smell, hearing, taste, seizures. Denies loss of consciousness. Psychiatric: Denies depression, sleep pattern changes, anxiety, change in personality  Endocrine: Denies mood swings, heat or cold intolerance  Hematologic/Lymphatic: Denies anemia, purpura, petechia  Allergic/Immunologic: Denies swelling of throat, pain or swelling at lymph nodes      Physical Examination:    Visit Vitals  BP (!) 142/86 (BP 1 Location: Right arm, BP Patient Position: Sitting, BP Cuff Size: Large adult)   Pulse 78   Temp 97 °F (36.1 °C) (Tympanic)   Resp 18   Ht 6' 4\" (1.93 m)   Wt 301 lb (136.5 kg)   SpO2 98%   BMI 36.64 kg/m²        General: AOX3, no apparent distress  Psychiatric: mood and affect appropriate  Lungs: breathing is symmetric and unlabored bilaterally  Heart: regular rate and rhythm  Abdomen: no guarding  Head: normocephalic, atraumatic  Skin: No significant abnormalities, good turgor  Sensation intact to light touch: C5-T1 dermatomes  Muscular exam: 5/5 strength in all major muscle groups unless noted in specialty exam.    Extremities      Right upper extremity: Extremity is examined, no evidence of gross deformity, no gross motor or sensory deficit. Full active and passive range of motion is noted. Muscle tone and bulk is within normal expected limits. Capillary refill is less than 2 seconds distally. Left upper extremity: Extremity is examined, no evidence of gross deformity, no gross motor or sensory deficit. Full active and passive range of motion is noted. Muscle tone and bulk is within normal expected limits. Capillary refill is less than 2 seconds distally. Right lower extremity: Extremity is examined, no evidence of gross deformity, no gross motor or sensory deficit. Full active and passive range of motion is noted. Muscle tone and bulk is within normal expected limits. Capillary refill is less than 2 seconds distally.   Left lower extremity: No gross deformity. Knee indicates small effusion. Significant joint line tenderness to palpation medially, not laterally. Positive Deneen's medially, not laterally. ACL, PCL, MCL, LCL are all intact to ligamentous testing. Capillary refill is less than 2 seconds distally. Knee flexion and extension is 5 out of 5 strength. Patella tracks centrally, no patellar grind. Diagnostics:    Pertinent Diagnostics: Xrays are available of the left knee, they indicate mild patellofemoral and medial compartment osteoarthritis of the knee joint, no significant other findings, no other osseus abnormalities, fractures, or dislocations. Assessment: Left knee pain, likely secondary to medial meniscal tear    Plan: This patient has the above-mentioned issue, I have had a long discussion with them regarding the treatment options which include nonoperative and operative. Due to the length of symptoms, as well as the clinical scenario, we have agreed to proceed with nonoperative management. I have advised the patient that should they have worsening symptoms, mechanical blockage to motion, or locked knee that they should call on an emergent basis. Otherwise, I did discuss the treatment options for this particular issue which include bracing, physical therapy, anti-inflammatories and pain medications as well as activity modification. I have also discussed the long-term effects of this particular issue which include progression of osteoarthritis faster than would normally have occurred. The patient will have an injection, PT. Follow-up will be in one week, the patient does not need x-rays on follow-up. Mr. Prosper Andrews has a reminder for a \"due or due soon\" health maintenance. I have asked that he contact his primary care provider for follow-up on this health maintenance. Date of Procedure: 2/22/2023  PROCEDURE NOTE: Left knee injection of Celestone    Consent was obtained from the patient.  The correct site was identified after confirmation with the patient. Following identification and confirmation of the correct site with the patient, the superolateral left knee was prepped in the normal sterile fashion. A local anesthetic of 1% lidocaine without epinephrine was then administered to the local tissues. Following, an injection of a mixture of  6 mg Celestone and 1% lidocaine without epinephrine was administered to the left knee. The needle was then withdrawn and the injection site dressed with a sterile bandage at the conclusion. The procedure was well tolerated by the patient. No immediate adverse reactions were noted. Post injection instructions were given.

## 2023-02-22 NOTE — PROGRESS NOTES
Identified pt with two pt identifiers (name and ). Reviewed chart in preparation for visit and have obtained necessary documentation. Gemma Romero is a 75 y.o. male  Chief Complaint   Patient presents with    Knee Pain     LT Knee     Visit Vitals  BP (!) 142/86 (BP 1 Location: Right arm, BP Patient Position: Sitting, BP Cuff Size: Large adult)   Pulse 78   Temp 97 °F (36.1 °C) (Tympanic)   Resp 18   Ht 6' 4\" (1.93 m)   Wt 301 lb (136.5 kg)   SpO2 98%   BMI 36.64 kg/m²     1. Have you been to the ER, urgent care clinic since your last visit? Hospitalized since your last visit? No    2. Have you seen or consulted any other health care providers outside of the [address] [place] [address] since your last visit? Include any pap smears or colon screening.  No

## 2023-03-02 ENCOUNTER — VIRTUAL VISIT (OUTPATIENT)
Dept: ORTHOPEDIC SURGERY | Age: 68
End: 2023-03-02
Payer: MEDICARE

## 2023-03-02 DIAGNOSIS — M25.562 CHRONIC PAIN OF LEFT KNEE: Primary | ICD-10-CM

## 2023-03-02 DIAGNOSIS — G89.29 CHRONIC PAIN OF LEFT KNEE: Primary | ICD-10-CM

## 2023-03-02 PROCEDURE — 1101F PT FALLS ASSESS-DOCD LE1/YR: CPT | Performed by: ORTHOPAEDIC SURGERY

## 2023-03-02 PROCEDURE — 99441 PR PHYS/QHP TELEPHONE EVALUATION 5-10 MIN: CPT | Performed by: ORTHOPAEDIC SURGERY

## 2023-03-02 NOTE — PROGRESS NOTES
3/2/2023      CC: left knee pain    HPI:      This is a 75y.o. year old male who presents for follow up of their left knee injection. The patient states that they have had very good relief of their pain. The time since injection has been approximately a week. PMH:  Past Medical History:   Diagnosis Date    Cancer [facility])     Liver cancer    Cancer of liver ([facility] [address] 1/16/2019    Cervical vertigo 9/18/2020    Cervicogenic headache 9/18/2020    Depression     Elevated liver enzymes 1/11/2018    Headache     History of pulmonary embolism 6/21/2016    Hypothyroidism due to acquired atrophy of thyroid 9/18/2020    Meniere disease 6/21/2016    Recurrent depression ([facility] [address] 1/11/2018    Sarcoid     Full recovery    Sarcoidosis, lung ([facility] [address] 7/9/2018    Stage 3a chronic kidney disease ([facility] [address] 2/16/2021    Vertebrobasilar artery insufficiency 9/18/2020    Vertigo 9/18/2020       PSxHx:  Past Surgical History:   Procedure Laterality Date    HX ACL RECONSTRUCTION  1997    HX CHOLECYSTECTOMY  2006    HX COLONOSCOPY  1/1/2013    HX LIVER TRANSPLANT      HX TONSIL AND ADENOIDECTOMY         Meds:    Current Outpatient Medications:     tiZANidine (ZANAFLEX) 2 mg tablet, Take 2 mg by mouth every six (6) hours as needed. , Disp: , Rfl:     rosuvastatin (CRESTOR) 20 mg tablet, Take 20 mg by mouth daily. , Disp: , Rfl:     meclizine (ANTIVERT) 12.5 mg tablet, 1-2 tabs TID prn vertigo dizzy  Indications: sensation of spinning or whirling, Disp: 180 Tablet, Rfl: 1    venlafaxine-SR (EFFEXOR-XR) 75 mg capsule, Take 1 Capsule by mouth daily. , Disp: 90 Capsule, Rfl: 3    warfarin (COUMADIN) 5 mg tablet, Take 5 mg by mouth daily. , Disp: , Rfl:     methocarbamoL (ROBAXIN) 500 mg tablet, Take 1 Tab by mouth three (3) times daily as needed for Muscle Spasm(s). , Disp: 100 Tab, Rfl: 5    tacrolimus (PROGRAF) 1 mg capsule, Take 1 mg by mouth two (2) times a day., Disp: , Rfl:     magnesium oxide 500 mg tab, Take 500 mg by mouth daily. , Disp: , Rfl: triamcinolone (NASACORT AQ) 55 mcg nasal inhaler, 2 Sprays as needed. , Disp: , Rfl:     acetaminophen (TYLENOL) 500 mg tablet, Take  by mouth every six (6) hours as needed for Pain., Disp: , Rfl:     All:  No Known Allergies    Social Hx:  Social History     Socioeconomic History    Marital status:     Number of children: 3   Occupational History    Occupation: retired   Tobacco Use    Smoking status: Former     Packs/day: 0.25     Years: 15.00     Pack years: 3.75     Types: Cigars, Cigarettes     Quit date: 2016     Years since quittin.2    Smokeless tobacco: Never    Tobacco comments:     Still smokes Cigars   Vaping Use    Vaping Use: Never used   Substance and Sexual Activity    Alcohol use: No     Alcohol/week: 0.0 standard drinks    Drug use: No    Sexual activity: Yes     Partners: Female   Social History Narrative    Lives with wife        Family Hx:  Family History   Problem Relation Age of Onset    Diabetes Mother     Stroke Mother     Dementia Mother     Migraines Mother     Heart Disease Mother     Neuropathy Mother     Cancer Father         lung    COPD Father          Review of Systems:       General: Denies headache, lethargy, fever, weight loss  Ears/Nose/Throat: Denies ear discharge, drainage, nosebleeds, hoarse voice, dental problems  Cardiovascular: Denies chest pain, shortness of breath  Lungs: Denies chest pain, breathing problems, wheezing, pneumonia  Stomach: Denies stomach pain, heartburn, constipation, irritable bowel  Skin: Denies rash, sores, open wounds  Musculoskeletal: left knee pain - resolved  Genitourinary: Denies dysuria, hematuria, polyuria  Gastrointestinal: Denies constipation, obstipation, diarrhea  Neurological: Denies changes in sight, smell, hearing, taste, seizures. Denies loss of consciousness.   Psychiatric: Denies depression, sleep pattern changes, anxiety, change in personality  Endocrine: Denies mood swings, heat or cold intolerance  Hematologic/Lymphatic: Denies anemia, purpura, petechia  Allergic/Immunologic: Denies swelling of throat, pain or swelling at lymph nodes      Physical Examination:    There were no vitals taken for this visit. General: AOX3, no apparent distress  Psychiatric: mood and affect appropriate        Diagnostics:    Pertinent Diagnostics: none    Assessment: Status post left knee injection  Plan: This patient and I discussed the normal course for injections, we discussed that pain relief will likely be temporary to some degree, but I cannot predict the longevity of relief. We also discussed the limitations of injections, and that I cannot inject the same area any more often than every three months. We will proceed with continued observation. Patient was in [place] at the time of consultation. I was in the office while conducting this encounter. Consent:  He and/or his healthcare decision maker is aware that this patient-initiated Telehealth encounter is a billable service, with coverage as determined by his insurance carrier. He is aware that he may receive a bill and has provided verbal consent to proceed: Yes    This virtual visit was conducted telephone encounter only. -  I affirm this is a Patient Initiated Episode with an Established Patient who has not had a related appointment within my department in the past 7 days or scheduled within the next 24 hours. Note: this encounter is not billable if this call serves to triage the patient into an appointment for the relevant concern. Total Time: minutes: 5-10 minutes. Mr. Chris Booth has a reminder for a \"due or due soon\" health maintenance. I have asked that he contact his primary care provider for follow-up on this health maintenance.

## 2023-05-25 RX ORDER — TACROLIMUS 1 MG/1
1 CAPSULE ORAL 2 TIMES DAILY
COMMUNITY
Start: 2020-01-21

## 2023-05-25 RX ORDER — METHOCARBAMOL 500 MG/1
500 TABLET, FILM COATED ORAL 3 TIMES DAILY PRN
COMMUNITY
Start: 2020-09-30

## 2023-05-25 RX ORDER — ACETAMINOPHEN 500 MG
TABLET ORAL EVERY 6 HOURS PRN
COMMUNITY

## 2023-05-25 RX ORDER — MECLIZINE HCL 12.5 MG/1
TABLET ORAL
COMMUNITY
Start: 2023-02-07

## 2023-05-25 RX ORDER — WARFARIN SODIUM 5 MG/1
5 TABLET ORAL DAILY
COMMUNITY
Start: 2020-12-16

## 2023-05-25 RX ORDER — ROSUVASTATIN CALCIUM 20 MG/1
20 TABLET, COATED ORAL DAILY
Qty: 30 TABLET | Refills: 11 | COMMUNITY
Start: 2022-05-26 | End: 2023-05-26

## 2023-05-25 RX ORDER — BACLOFEN 20 MG
500 TABLET ORAL DAILY
COMMUNITY

## 2023-05-25 RX ORDER — TIZANIDINE 2 MG/1
2 TABLET ORAL EVERY 6 HOURS PRN
COMMUNITY
Start: 2022-09-01

## 2023-05-25 RX ORDER — TRIAMCINOLONE ACETONIDE 55 UG/1
2 SPRAY, METERED NASAL PRN
COMMUNITY

## 2023-05-25 RX ORDER — VENLAFAXINE HYDROCHLORIDE 75 MG/1
75 CAPSULE, EXTENDED RELEASE ORAL DAILY
COMMUNITY
Start: 2023-02-07

## 2023-08-15 ENCOUNTER — TELEMEDICINE (OUTPATIENT)
Age: 68
End: 2023-08-15
Payer: MEDICARE

## 2023-08-15 DIAGNOSIS — G43.109 MIGRAINE WITH VERTIGO: Primary | ICD-10-CM

## 2023-08-15 DIAGNOSIS — H81.02 MENIERE'S DISEASE OF LEFT EAR: ICD-10-CM

## 2023-08-15 DIAGNOSIS — M54.81 OCCIPITAL NEURALGIA OF LEFT SIDE: ICD-10-CM

## 2023-08-15 DIAGNOSIS — R42 VERTIGO: ICD-10-CM

## 2023-08-15 PROCEDURE — 1123F ACP DISCUSS/DSCN MKR DOCD: CPT | Performed by: PSYCHIATRY & NEUROLOGY

## 2023-08-15 PROCEDURE — 3017F COLORECTAL CA SCREEN DOC REV: CPT | Performed by: PSYCHIATRY & NEUROLOGY

## 2023-08-15 PROCEDURE — G8427 DOCREV CUR MEDS BY ELIG CLIN: HCPCS | Performed by: PSYCHIATRY & NEUROLOGY

## 2023-08-15 PROCEDURE — G8417 CALC BMI ABV UP PARAM F/U: HCPCS | Performed by: PSYCHIATRY & NEUROLOGY

## 2023-08-15 PROCEDURE — 1036F TOBACCO NON-USER: CPT | Performed by: PSYCHIATRY & NEUROLOGY

## 2023-08-15 PROCEDURE — 99215 OFFICE O/P EST HI 40 MIN: CPT | Performed by: PSYCHIATRY & NEUROLOGY

## 2023-08-15 RX ORDER — MECLIZINE HCL 12.5 MG/1
TABLET ORAL
Qty: 30 TABLET | Refills: 5 | Status: SHIPPED | OUTPATIENT
Start: 2023-08-15

## 2023-08-15 RX ORDER — VENLAFAXINE HYDROCHLORIDE 75 MG/1
75 CAPSULE, EXTENDED RELEASE ORAL DAILY
Qty: 90 CAPSULE | Refills: 3 | Status: SHIPPED | OUTPATIENT
Start: 2023-08-15

## 2023-08-15 ASSESSMENT — PATIENT HEALTH QUESTIONNAIRE - PHQ9
SUM OF ALL RESPONSES TO PHQ QUESTIONS 1-9: 0
SUM OF ALL RESPONSES TO PHQ9 QUESTIONS 1 & 2: 0
SUM OF ALL RESPONSES TO PHQ QUESTIONS 1-9: 0
SUM OF ALL RESPONSES TO PHQ QUESTIONS 1-9: 0
2. FEELING DOWN, DEPRESSED OR HOPELESS: 0
1. LITTLE INTEREST OR PLEASURE IN DOING THINGS: 0
SUM OF ALL RESPONSES TO PHQ QUESTIONS 1-9: 0

## 2023-08-15 NOTE — ASSESSMENT & PLAN NOTE
Presently well controlled infrequent migraines continue on venlafaxine 75 mg daily    Patient is continue with positive behavioral and lifestyle changes and continued to remain well-hydrated

## 2023-08-15 NOTE — ASSESSMENT & PLAN NOTE
Infrequent symptoms responds well to Antivert patient to continue on Antivert 12.5 mg 1 to 2 tablets 3 times daily as needed

## 2023-08-15 NOTE — ASSESSMENT & PLAN NOTE
Remains well controlled with the use of Effexor 75 mg daily and can have Robaxin as needed  Continue on medications as stated above

## 2023-08-15 NOTE — PATIENT INSTRUCTIONS
As per discussion over overall you are doing well continue with all the behavioral changes you have made I think you have done an excellent job! I will continue want you on the venlafaxine known as Effexor and the Antivert also known as meclizine  Prescription of this on both have been sent to your pharmacy    Continue with the hydration especially when you are outdoors whether it is golf or work. And continue with the 2-1 or 3:1 ratio regarding water to Gatorade    We will see you back in a year for med check and reach out to me if you are having problems before then        Office Policies    Phone calls/patient messages:  Please allow up to 24 hours for someone in the office to contact you about your call or message. Be mindful your provider may be out of the office or your message may require further review. We encourage you to use [employer] for your messages as this is a faster, more efficient way to communicate with our office    Medication Refills:  Prescription medications require up to 48 business hours to process. We encourage you to use [employer] for your refills. For controlled medications: Please allow up to 72 business hours to process. Certain medications may require you to  a written prescription at our office. NO narcotic/controlled medications will be prescribed after 4pm Monday through Friday or on weekends    Form/Paperwork Completion:  We ask that you allow 7-14 business days. You may also download your forms to [employer] to have your doctor print off.

## 2024-03-13 NOTE — TELEPHONE ENCOUNTER
Patient called to request refills for venlafaxine (EFFEXOR XR) 75 MG extended release capsule and meclizine (ANTIVERT) 12.5 MG tablet. Pt would like for the medications to se sent to [facility] Pt also states that his vertigo has increase and would like to discuss this with the nurse. Please call [phone]

## 2024-03-19 NOTE — TELEPHONE ENCOUNTER
Last office visit8/15/2023  Next office visit 8/6/2024  Last med refill  Effexor 8/15/2023  Meclizine 8/15/2023

## 2024-03-20 RX ORDER — MECLIZINE HCL 12.5 MG/1
TABLET ORAL
Qty: 30 TABLET | Refills: 5 | Status: SHIPPED | OUTPATIENT
Start: 2024-03-20

## 2024-03-20 RX ORDER — VENLAFAXINE HYDROCHLORIDE 75 MG/1
75 CAPSULE, EXTENDED RELEASE ORAL DAILY
Qty: 90 CAPSULE | Refills: 3 | Status: SHIPPED | OUTPATIENT
Start: 2024-03-20